# Patient Record
Sex: MALE | Race: WHITE | NOT HISPANIC OR LATINO | Employment: OTHER | URBAN - METROPOLITAN AREA
[De-identification: names, ages, dates, MRNs, and addresses within clinical notes are randomized per-mention and may not be internally consistent; named-entity substitution may affect disease eponyms.]

---

## 2017-01-16 ENCOUNTER — OFFICE VISIT (OUTPATIENT)
Dept: ORTHOPEDIC SURGERY | Facility: CLINIC | Age: 67
End: 2017-01-16

## 2017-01-16 VITALS — WEIGHT: 242 LBS | BODY MASS INDEX: 32.78 KG/M2 | HEIGHT: 72 IN | TEMPERATURE: 97.8 F

## 2017-01-16 DIAGNOSIS — R52 PAIN: Primary | ICD-10-CM

## 2017-01-16 DIAGNOSIS — M17.10 OSTEOARTHROSIS, LOCALIZED, PRIMARY, KNEE, UNSPECIFIED LATERALITY: ICD-10-CM

## 2017-01-16 PROCEDURE — 20610 DRAIN/INJ JOINT/BURSA W/O US: CPT | Performed by: ORTHOPAEDIC SURGERY

## 2017-01-16 RX ORDER — LIDOCAINE HYDROCHLORIDE 10 MG/ML
4 INJECTION, SOLUTION INFILTRATION; PERINEURAL
Status: COMPLETED | OUTPATIENT
Start: 2017-01-16 | End: 2017-01-16

## 2017-01-16 RX ADMIN — LIDOCAINE HYDROCHLORIDE 4 ML: 10 INJECTION, SOLUTION INFILTRATION; PERINEURAL at 13:54

## 2017-01-16 NOTE — PROGRESS NOTES
"Synvisc Injection      Patient: Aldair Cordova        YOB: 1950            Chief Complaints: Knee pain      History of Present Illness: Pt gets intermittent  injections with good relief. Is here for repeat injection. Understands options.      Physical Exam: 66 y.o. male  General Appearance:    Alert, cooperative, in no acute distress                   Vitals:    01/16/17 1330   Temp: 97.8 °F (36.6 °C)   Weight: 242 lb (110 kg)   Height: 72\" (182.9 cm)      Patient is alert and read ×3 no acute distress appears her above-listed at height weight and age.  Affect is normal respiratory rate is normal unlabored. Heart rate regular rate rhythm, sclera, dentition and hearing are normal for the purpose of this exam.  Exam and complaints are unchanged.      Procedure:  Large Joint Arthrocentesis  Date/Time: 1/16/2017 1:54 PM  Consent given by: patient  Site marked: site marked  Timeout: Immediately prior to procedure a time out was called to verify the correct patient, procedure, equipment, support staff and site/side marked as required   Supporting Documentation  Indications: pain   Procedure Details  Location: knee - R knee  Needle size: 25 G  Approach: anteromedial  Medications administered: 4 mL lidocaine 1 %; 48 mg hylan 48 MG/6ML      Large Joint Arthrocentesis  Date/Time: 1/16/2017 1:54 PM  Consent given by: patient  Site marked: site marked  Timeout: Immediately prior to procedure a time out was called to verify the correct patient, procedure, equipment, support staff and site/side marked as required   Supporting Documentation  Indications: pain   Procedure Details  Location: knee - L knee  Needle size: 25 G  Approach: anteromedial  Medications administered: 4 mL lidocaine 1 %; 48 mg hylan 48 MG/6ML                  Assessment. Persistent knee pain      Plan: Is to proceed with injection    The knee joint was injected under strict sterile technique with Synvisc this was done sterilely and tolerated " tolerated well.  He understands his options and will give me a call

## 2017-01-16 NOTE — MR AVS SNAPSHOT
Aldair Cordova   1/16/2017 1:30 PM   Office Visit    Dept Phone:  [phone]   Encounter #:  [id number]    Provider:  Rosa Irizarry MD   Department:  [facility]                Your Full Care Plan              Your Updated Medication List          This list is accurate as of: 1/16/17  2:21 PM.  Always use your most recent med list.                aspirin 81 MG tablet       benazepril 10 MG tablet   Commonly known as:  LOTENSIN   TAKE 1 TABLET DAILY       doxazosin 4 MG tablet   Commonly known as:  CARDURA   Take 1 tablet by mouth daily.       EQL FISH OIL 1000 MG capsule       GLUCOSAMINE CHONDR 1500 COMPLX PO       HYDROcodone-acetaminophen 7.5-325 MG per tablet   Commonly known as:  NORCO   Take 1 tablet by mouth Every 6 (Six) Hours As Needed for severe pain (7-10).       Magnesium 250 MG tablet       meloxicam 7.5 MG tablet   Commonly known as:  MOBIC   Take 1 tablet by mouth Daily.       MULTI COMPLETE capsule       pantoprazole 40 MG EC tablet   Commonly known as:  PROTONIX   Take 1 tablet by mouth Daily.       * simvastatin 40 MG tablet   Commonly known as:  ZOCOR   TAKE 1 TABLET EVERY NIGHT       * simvastatin 40 MG tablet   Commonly known as:  ZOCOR   TAKE 1 TABLET DAILY PLEASE MAKE AN APPOINTMENT WITH   YOUR DOCTOR       * Notice:  This list has 2 medication(s) that are the same as other medications prescribed for you. Read the directions carefully, and ask your doctor or other care provider to review them with you.            We Performed the Following     Large Joint Arthrocentesis     Large Joint Arthrocentesis       You Were Diagnosed With        Codes Comments    Pain    -  Primary ICD-10-CM: R52  ICD-9-CM: 780.96       Medications to be Given to You by a Medical Professional     Due       Frequency    12/13/2016 hylan (SYNVISC ONE) injection 48 mg  Once      Instructions     None    Patient Instructions History    "  Upcoming Appointments     Visit Type Date Time Department    FOLLOW UP 2017  1:30 PM [facility]    OFFICE VISIT 2017  8:45 AM [facility] Signup     [facility] allows you to send messages to your doctor, view your test results, renew your prescriptions, schedule appointments, and more. To sign up, go to [employer]Lex and click on the Sign Up Now link in the New User? box. Enter your [employer] Activation Code exactly as it appears below along with the last four digits of your Social Security Number and your Date of Birth () to complete the sign-up process. If you do not sign up before the expiration date, you must request a new code.    [employer] Activation Code: [email or web address]  Expires: 2017  5:38 AM    If you have questions, you can email [email or web address] or call [phone] to talk to our [employer] staff. Remember, [employer] is NOT to be used for urgent needs. For medical emergencies, dial 911.               Other Info from Your Visit           Your Appointments     2017  8:45 AM EST   Office Visit with Agustin Guerra MD   [facility] (--)    [address] [place] [id number] [phone]           Arrive 15 minutes prior to appointment.              Allergies     No Known Allergies      Reason for Visit     Left Knee - Follow-up, Pain     Right Knee - Follow-up, Pain           Vital Signs     Temperature Height Weight Body Mass Index Smoking Status       97.8 °F (36.6 °C) 72\" (182.9 cm) 242 lb (110 kg) 32.82 kg/m2 Never Smoker       Problems and Diagnoses Noted     Pain    -  Primary        "

## 2017-01-16 NOTE — PROGRESS NOTES
"Knee Follow Up      Patient: Aldair Cordova        YOB: 1950            Chief Complaints: bilateral knee pain      History of Present Illness:      Physical Exam: 66 y.o. male  General Appearance:    Alert, cooperative, in no acute distress                   Vitals:    01/16/17 1330   Temp: 97.8 °F (36.6 °C)   Weight: 242 lb (110 kg)   Height: 72\" (182.9 cm)        Patient is alert and read ×3 no acute distress appears her above-listed at height weight and age.  Affect is normal respiratory rate is normal unlabored. Heart rate regular rate rhythm, sclera, dentition and hearing are normal for the purpose of this exam.      Ortho Exam                 Assessment/Plan:            "

## 2017-02-01 RX ORDER — PANTOPRAZOLE SODIUM 40 MG/1
TABLET, DELAYED RELEASE ORAL
Qty: 90 TABLET | Refills: 2 | Status: SHIPPED | OUTPATIENT
Start: 2017-02-01 | End: 2017-02-06

## 2017-02-06 ENCOUNTER — OFFICE VISIT (OUTPATIENT)
Dept: FAMILY MEDICINE CLINIC | Facility: CLINIC | Age: 67
End: 2017-02-06

## 2017-02-06 VITALS
DIASTOLIC BLOOD PRESSURE: 80 MMHG | RESPIRATION RATE: 18 BRPM | BODY MASS INDEX: 32.85 KG/M2 | TEMPERATURE: 97.6 F | OXYGEN SATURATION: 97 % | HEART RATE: 56 BPM | WEIGHT: 242.5 LBS | HEIGHT: 72 IN | SYSTOLIC BLOOD PRESSURE: 130 MMHG

## 2017-02-06 DIAGNOSIS — E66.01 MORBID OBESITY DUE TO EXCESS CALORIES (HCC): ICD-10-CM

## 2017-02-06 DIAGNOSIS — I10 ESSENTIAL HYPERTENSION: ICD-10-CM

## 2017-02-06 DIAGNOSIS — M17.10 OSTEOARTHROSIS, LOCALIZED, PRIMARY, KNEE, UNSPECIFIED LATERALITY: ICD-10-CM

## 2017-02-06 DIAGNOSIS — Z00.00 HEALTH CARE MAINTENANCE: ICD-10-CM

## 2017-02-06 DIAGNOSIS — E78.00 PURE HYPERCHOLESTEROLEMIA: Primary | ICD-10-CM

## 2017-02-06 PROCEDURE — 90670 PCV13 VACCINE IM: CPT | Performed by: INTERNAL MEDICINE

## 2017-02-06 PROCEDURE — 99214 OFFICE O/P EST MOD 30 MIN: CPT | Performed by: INTERNAL MEDICINE

## 2017-02-06 PROCEDURE — 90471 IMMUNIZATION ADMIN: CPT | Performed by: INTERNAL MEDICINE

## 2017-02-06 RX ORDER — DOXAZOSIN MESYLATE 4 MG/1
4 TABLET ORAL DAILY
Qty: 90 TABLET | Refills: 3 | Status: SHIPPED | OUTPATIENT
Start: 2017-02-06 | End: 2018-02-04 | Stop reason: SDUPTHER

## 2017-02-06 NOTE — PROGRESS NOTES
"Subjective   Patient ID: Aldair Cordova is a 67 y.o. male presents with   Chief Complaint   Patient presents with   • Hyperlipidemia     f/u   • Hypertension     said he had labs done at [facility] on wed HPI - this patient presents today for follow-up for hypertension, hypercholesterolemia, obesity, osteoarthritis of the knee.  Overall he's doing pretty well with the exception of his knees.  He sees orthopedics and they've been doing some injections.  I want him to lose more weight I think this would help his knees cholesterol and blood pressure.    Assessment plan    Hypertension controlled with benazepril 10    Hyperlipidemia-controlled with simvastatin 40    Osteoarthritis he sees orthopedics and I want him to lose more weight    Obesity-talked about exercise diet weight loss    Health care maintenance-Prevnar 13, recommended getting shingles done at his local pharmacy and a flu shot.    No Known Allergies    The following portions of the patient's history were reviewed and updated as appropriate: allergies, current medications, past family history, past medical history, past social history, past surgical history and problem list.      Review of Systems   Constitutional: Negative.    HENT: Negative.    Eyes: Negative.    Respiratory: Negative.    Cardiovascular: Negative.    Gastrointestinal: Negative.    Endocrine: Negative.    Genitourinary: Negative.    Musculoskeletal: Positive for arthralgias and gait problem.   Skin: Negative.    Allergic/Immunologic: Negative.    Hematological: Negative.    Psychiatric/Behavioral: Negative.        Objective     Vitals:    02/06/17 0831   BP: 130/80   Pulse: 56   Resp: 18   Temp: 97.6 °F (36.4 °C)   TempSrc: Oral   SpO2: 97%   Weight: 242 lb 8 oz (110 kg)   Height: 72\" (182.9 cm)         Physical Exam   Constitutional: He is oriented to person, place, and time. He appears well-developed and well-nourished. No distress.   HENT:   Head: Normocephalic and atraumatic. "   Eyes: Conjunctivae and EOM are normal. Pupils are equal, round, and reactive to light. Right eye exhibits no discharge. Left eye exhibits no discharge. No scleral icterus.   Neck: Normal range of motion. Neck supple. No tracheal deviation present. No thyromegaly present.   Cardiovascular: Normal rate, regular rhythm, normal heart sounds and normal pulses.  Exam reveals no gallop.    No murmur heard.  Pulmonary/Chest: Effort normal and breath sounds normal. No respiratory distress. He has no wheezes. He has no rales.   Musculoskeletal: Normal range of motion.   Neurological: He is alert and oriented to person, place, and time.   Skin: Skin is warm. No rash noted. No erythema. No pallor.   Psychiatric: He has a normal mood and affect. His behavior is normal. Judgment and thought content normal.   Nursing note and vitals reviewed.        Aldair was seen today for hyperlipidemia and hypertension.    Diagnoses and all orders for this visit:    Pure hypercholesterolemia    Health care maintenance    Essential hypertension    Morbid obesity due to excess calories    Osteoarthrosis, localized, primary, knee, unspecified laterality    Other orders  -     Pneumococcal Conjugate Vaccine 13-Valent All        Call or return to clinic prn if these symptoms worsen or fail to improve as anticipated.

## 2017-03-15 RX ORDER — SIMVASTATIN 40 MG
TABLET ORAL
Qty: 90 TABLET | Refills: 1 | Status: SHIPPED | OUTPATIENT
Start: 2017-03-15 | End: 2017-09-14 | Stop reason: SDUPTHER

## 2017-05-31 RX ORDER — SIMVASTATIN 40 MG
TABLET ORAL
Qty: 90 TABLET | Refills: 0 | Status: SHIPPED | OUTPATIENT
Start: 2017-05-31 | End: 2020-07-08

## 2017-05-31 RX ORDER — MELOXICAM 7.5 MG/1
TABLET ORAL
Qty: 90 TABLET | Refills: 1 | Status: SHIPPED | OUTPATIENT
Start: 2017-05-31 | End: 2017-12-09 | Stop reason: SDUPTHER

## 2017-07-18 DIAGNOSIS — E78.5 HYPERLIPIDEMIA, UNSPECIFIED HYPERLIPIDEMIA TYPE: ICD-10-CM

## 2017-07-18 DIAGNOSIS — I15.9 SECONDARY HYPERTENSION: ICD-10-CM

## 2017-07-18 DIAGNOSIS — N40.0 BENIGN NON-NODULAR PROSTATIC HYPERPLASIA WITHOUT LOWER URINARY TRACT SYMPTOMS: Primary | ICD-10-CM

## 2017-07-19 ENCOUNTER — HOSPITAL ENCOUNTER (EMERGENCY)
Facility: HOSPITAL | Age: 67
Discharge: HOME | End: 2017-07-19
Attending: EMERGENCY MEDICINE | Admitting: EMERGENCY MEDICINE

## 2017-07-19 ENCOUNTER — APPOINTMENT (OUTPATIENT)
Dept: CT IMAGING | Facility: HOSPITAL | Age: 67
End: 2017-07-19

## 2017-07-19 VITALS
RESPIRATION RATE: 15 BRPM | WEIGHT: 240 LBS | SYSTOLIC BLOOD PRESSURE: 129 MMHG | HEART RATE: 62 BPM | DIASTOLIC BLOOD PRESSURE: 80 MMHG | TEMPERATURE: 98.7 F | OXYGEN SATURATION: 95 % | BODY MASS INDEX: 32.51 KG/M2 | HEIGHT: 72 IN

## 2017-07-19 DIAGNOSIS — R53.1 GENERALIZED WEAKNESS: Primary | ICD-10-CM

## 2017-07-19 DIAGNOSIS — R20.2 TINGLING IN EXTREMITIES: ICD-10-CM

## 2017-07-19 LAB
ALBUMIN SERPL-MCNC: 4.4 G/DL (ref 3.5–5.2)
ALBUMIN/GLOB SERPL: 1.5 G/DL
ALP SERPL-CCNC: 67 U/L (ref 39–117)
ALT SERPL W P-5'-P-CCNC: 28 U/L (ref 1–41)
ANION GAP SERPL CALCULATED.3IONS-SCNC: 13.7 MMOL/L
APTT PPP: 30.1 SECONDS (ref 22.7–35.4)
AST SERPL-CCNC: 23 U/L (ref 1–40)
BASOPHILS # BLD AUTO: 0.04 10*3/MM3 (ref 0–0.2)
BASOPHILS NFR BLD AUTO: 0.7 % (ref 0–1.5)
BILIRUB SERPL-MCNC: 0.3 MG/DL (ref 0.1–1.2)
BUN BLD-MCNC: 12 MG/DL (ref 8–23)
BUN/CREAT SERPL: 11.3 (ref 7–25)
CALCIUM SPEC-SCNC: 9.5 MG/DL (ref 8.6–10.5)
CHLORIDE SERPL-SCNC: 104 MMOL/L (ref 98–107)
CO2 SERPL-SCNC: 22.3 MMOL/L (ref 22–29)
CREAT BLD-MCNC: 1.06 MG/DL (ref 0.76–1.27)
DEPRECATED RDW RBC AUTO: 39.5 FL (ref 37–54)
EOSINOPHIL # BLD AUTO: 0.1 10*3/MM3 (ref 0–0.7)
EOSINOPHIL NFR BLD AUTO: 1.8 % (ref 0.3–6.2)
ERYTHROCYTE [DISTWIDTH] IN BLOOD BY AUTOMATED COUNT: 12.3 % (ref 11.5–14.5)
GFR SERPL CREATININE-BSD FRML MDRD: 70 ML/MIN/1.73
GLOBULIN UR ELPH-MCNC: 2.9 GM/DL
GLUCOSE BLD-MCNC: 118 MG/DL (ref 65–99)
HCT VFR BLD AUTO: 43.3 % (ref 40.4–52.2)
HGB BLD-MCNC: 14.7 G/DL (ref 13.7–17.6)
IMM GRANULOCYTES # BLD: 0 10*3/MM3 (ref 0–0.03)
IMM GRANULOCYTES NFR BLD: 0 % (ref 0–0.5)
INR PPP: 1 (ref 0.9–1.1)
LYMPHOCYTES # BLD AUTO: 1.45 10*3/MM3 (ref 0.9–4.8)
LYMPHOCYTES NFR BLD AUTO: 26.5 % (ref 19.6–45.3)
MCH RBC QN AUTO: 29.6 PG (ref 27–32.7)
MCHC RBC AUTO-ENTMCNC: 33.9 G/DL (ref 32.6–36.4)
MCV RBC AUTO: 87.3 FL (ref 79.8–96.2)
MONOCYTES # BLD AUTO: 0.54 10*3/MM3 (ref 0.2–1.2)
MONOCYTES NFR BLD AUTO: 9.9 % (ref 5–12)
NEUTROPHILS # BLD AUTO: 3.35 10*3/MM3 (ref 1.9–8.1)
NEUTROPHILS NFR BLD AUTO: 61.1 % (ref 42.7–76)
NRBC BLD MANUAL-RTO: 0 /100 WBC (ref 0–0)
PLATELET # BLD AUTO: 121 10*3/MM3 (ref 140–500)
PMV BLD AUTO: 10.8 FL (ref 6–12)
POTASSIUM BLD-SCNC: 4.4 MMOL/L (ref 3.5–5.2)
PROT SERPL-MCNC: 7.3 G/DL (ref 6–8.5)
PROTHROMBIN TIME: 12.8 SECONDS (ref 11.7–14.2)
RBC # BLD AUTO: 4.96 10*6/MM3 (ref 4.6–6)
SODIUM BLD-SCNC: 140 MMOL/L (ref 136–145)
TROPONIN T SERPL-MCNC: <0.01 NG/ML (ref 0–0.03)
WBC NRBC COR # BLD: 5.48 10*3/MM3 (ref 4.5–10.7)

## 2017-07-19 PROCEDURE — 85610 PROTHROMBIN TIME: CPT | Performed by: EMERGENCY MEDICINE

## 2017-07-19 PROCEDURE — 80053 COMPREHEN METABOLIC PANEL: CPT | Performed by: EMERGENCY MEDICINE

## 2017-07-19 PROCEDURE — 70450 CT HEAD/BRAIN W/O DYE: CPT

## 2017-07-19 PROCEDURE — 85730 THROMBOPLASTIN TIME PARTIAL: CPT | Performed by: EMERGENCY MEDICINE

## 2017-07-19 PROCEDURE — 93005 ELECTROCARDIOGRAM TRACING: CPT | Performed by: EMERGENCY MEDICINE

## 2017-07-19 PROCEDURE — 84484 ASSAY OF TROPONIN QUANT: CPT | Performed by: EMERGENCY MEDICINE

## 2017-07-19 PROCEDURE — 85025 COMPLETE CBC W/AUTO DIFF WBC: CPT | Performed by: EMERGENCY MEDICINE

## 2017-07-19 PROCEDURE — 99284 EMERGENCY DEPT VISIT MOD MDM: CPT

## 2017-07-19 PROCEDURE — 93010 ELECTROCARDIOGRAM REPORT: CPT | Performed by: INTERNAL MEDICINE

## 2017-07-19 NOTE — ED PROVIDER NOTES
" EMERGENCY DEPARTMENT ENCOUNTER    CHIEF COMPLAINT  Chief Complaint: generalized weakness/extremity tingling  History given by: patient  History limited by: nothing  Room Number: 18/18  PMD: Agustin Guerra MD      HPI:  Pt is a 67 y.o. male who presents complaining of sudden onset of generalized weakness, tingling in both upper extremities, and an approximately 15 minute \"tunnel vision\" where patient states could not see peripherally out of either eye.  Symptoms markedly improved without intervention, however states weakness overall remains.  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  Denies feeling overheated or dehydrated.  Denies CP, SOA, N/V, diarrhea, or abdominal pain.  Pt currently complains of a mild generalized headache.  Denies fever or neck pain      Duration:  15 minutes  Onset: 2 hours ago  Timing:  sudden  Location: generalized  Radiation: none  Quality: tingling/numbness  Intensity/Severity: mild  Progression: improved/resolved  Associated Symptoms: tingling extremities/tunnel vision  Aggravating Factors: none noted  Alleviating Factors: none noted  Previous Episodes: none  Treatment before arrival: none    PAST MEDICAL HISTORY  Active Ambulatory Problems     Diagnosis Date Noted   • Anxiety 02/02/2016   • Arthritis 02/02/2016   • Gastroesophageal reflux disease with esophagitis 02/02/2016   • Hyperlipidemia 02/02/2016   • Hypertension 02/02/2016   • Nocturia 02/02/2016   • Thrombocytopenia 02/08/2016   • Morbid obesity due to excess calories 08/08/2016   • Acute pain of left knee 11/07/2016   • Left knee pain 12/15/2016   • Osteoarthrosis, localized, primary, knee 12/15/2016   • Health care maintenance 02/06/2017     Resolved Ambulatory Problems     Diagnosis Date Noted   • No Resolved Ambulatory Problems     Past Medical History:   Diagnosis Date   • Benign essential hypertension    • GERD (gastroesophageal reflux disease)    • HLD (hyperlipidemia)        PAST SURGICAL " HISTORY  Past Surgical History:   Procedure Laterality Date   • COLONOSCOPY  10/21/2016   • KIDNEY STONE SURGERY     • KNEE SURGERY         FAMILY HISTORY  Family History   Problem Relation Age of Onset   • Cancer Mother      lung       SOCIAL HISTORY  Social History     Social History   • Marital status:      Spouse name: N/A   • Number of children: N/A   • Years of education: N/A     Occupational History   • Not on file.     Social History Main Topics   • Smoking status: Never Smoker   • Smokeless tobacco: Never Used   • Alcohol use No   • Drug use: No   • Sexual activity: Defer     Other Topics Concern   • Not on file     Social History Narrative   • No narrative on file       ALLERGIES  Review of patient's allergies indicates no known allergies.    REVIEW OF SYSTEMS  Review of Systems   Constitutional: Negative for activity change, appetite change and fever.   HENT: Negative for congestion and sore throat.    Eyes: Negative.    Respiratory: Negative for cough and shortness of breath.    Cardiovascular: Negative for chest pain and leg swelling.   Gastrointestinal: Negative for abdominal pain, diarrhea and vomiting.   Endocrine: Negative.    Genitourinary: Negative for decreased urine volume and dysuria.   Musculoskeletal: Negative for neck pain.   Skin: Negative for rash and wound.   Allergic/Immunologic: Negative.    Neurological: Positive for weakness, light-headedness, numbness and headaches.        Numbness/tingling to bilateral upper extremities (fingers)   Hematological: Negative.    Psychiatric/Behavioral: Negative.    All other systems reviewed and are negative.      PHYSICAL EXAM  ED Triage Vitals   Temp Heart Rate Resp BP SpO2   07/19/17 0409 07/19/17 0409 07/19/17 0409 07/19/17 0409 07/19/17 0409   98.7 °F (37.1 °C) 70 16 126/88 99 %      Temp src Heart Rate Source Patient Position BP Location FiO2 (%)   -- -- -- -- --              Physical Exam   Constitutional: He is oriented to person, place,  and time. He appears distressed.   HENT:   Head: Normocephalic and atraumatic.   Eyes: Conjunctivae and EOM are normal. Pupils are equal, round, and reactive to light.   Neck: No tracheal deviation present. No thyromegaly present.   Cardiovascular: Normal rate, regular rhythm and normal heart sounds.    No murmur heard.  Pulmonary/Chest: Effort normal and breath sounds normal. No respiratory distress. He has no wheezes. He has no rales.   Abdominal: Soft. He exhibits no distension. There is no tenderness.   Musculoskeletal: He exhibits no edema or deformity.   Neurological: He is alert and oriented to person, place, and time. No cranial nerve deficit. Gait normal. GCS score is 15.   Skin: Skin is dry. No rash noted.       LAB RESULTS  Lab Results (last 24 hours)     Procedure Component Value Units Date/Time    Protime-INR [[id number]]  (Normal) Collected:  07/19/17 0255    Specimen:  Blood Updated:  07/19/17 0429     Protime 12.8 Seconds      INR 1.00    aPTT [[id number]]  (Normal) Collected:  07/19/17 0255    Specimen:  Blood Updated:  07/19/17 0429     PTT 30.1 seconds     CBC & Differential [[id number]] Collected:  07/19/17 0255    Specimen:  Blood Updated:  07/19/17 0432    Narrative:       The following orders were created for panel order CBC & Differential.  Procedure                               Abnormality         Status                     ---------                               -----------         ------                     CBC Auto Differential[[id number]]        Abnormal            Final result                 Please view results for these tests on the individual orders.    CBC Auto Differential [[id number]]  (Abnormal) Collected:  07/19/17 0255    Specimen:  Blood Updated:  07/19/17 0432     WBC 5.48 10*3/mm3      RBC 4.96 10*6/mm3      Hemoglobin 14.7 g/dL      Hematocrit 43.3 %      MCV 87.3 fL      MCH 29.6 pg      MCHC 33.9 g/dL      RDW 12.3 %      RDW-SD 39.5 fl      MPV 10.8 fL      Platelets 121 (L)  10*3/mm3      Neutrophil % 61.1 %      Lymphocyte % 26.5 %      Monocyte % 9.9 %      Eosinophil % 1.8 %      Basophil % 0.7 %      Immature Grans % 0.0 %      Neutrophils, Absolute 3.35 10*3/mm3      Lymphocytes, Absolute 1.45 10*3/mm3      Monocytes, Absolute 0.54 10*3/mm3      Eosinophils, Absolute 0.10 10*3/mm3      Basophils, Absolute 0.04 10*3/mm3      Immature Grans, Absolute 0.00 10*3/mm3      nRBC 0.0 /100 WBC     Comprehensive Metabolic Panel [[id number]]  (Abnormal) Collected:  07/19/17 0435    Specimen:  Blood Updated:  07/19/17 0438     Glucose 118 (H) mg/dL      BUN 12 mg/dL      Creatinine 1.06 mg/dL      Sodium 140 mmol/L      Potassium 4.4 mmol/L      Chloride 104 mmol/L      CO2 22.3 mmol/L      Calcium 9.5 mg/dL      Total Protein 7.3 g/dL      Albumin 4.40 g/dL      ALT (SGPT) 28 U/L      AST (SGOT) 23 U/L      Alkaline Phosphatase 67 U/L      Total Bilirubin 0.3 mg/dL      eGFR Non African Amer 70 mL/min/1.73      Globulin 2.9 gm/dL      A/G Ratio 1.5 g/dL      BUN/Creatinine Ratio 11.3     Anion Gap 13.7 mmol/L     Troponin [[id number]]  (Normal) Collected:  07/19/17 0435    Specimen:  Blood Updated:  07/19/17 0438     Troponin T <0.010 ng/mL     Narrative:       Troponin T Reference Ranges:  Less than 0.03 ng/mL:    Negative for AMI  0.03 to 0.09 ng/mL:      Indeterminant for AMI  Greater than 0.09 ng/mL: Positive for AMI          I ordered the above labs and reviewed the results    RADIOLOGY  CT Head Without Contrast    (Results Pending)    negative    I ordered the above noted radiological studies. Interpreted by radiologist.  Reviewed by me in PACS.       PROCEDURES  Procedures    EKG         EKG time: 0254   Rhythm/Rate: sinus rhythm, rate: Sinus bradycardia, 53   Normal P waves and normal SD interval   Normal QRS, Normal axis  Normal ST and T waves    Interpreted Contemporaneously by me, independently viewed  No prior EKG for comparison.      PROGRESS AND CONSULTS  ED Course   Value Comment  By Time   Glucose: (!) 118 (Reviewed) Teodoro Sadler 07/19 0604     0430:  Re-evaluation of pt, pt states feels much better after the fluids.  Pt ambulated to the bathroom without assistance or difficulty and with a steady gait.  All questions or concerns addressed and answered.  Will follow up with pmd.      MEDICAL DECISION MAKING  Results were reviewed/discussed with the patient and they were also made aware of online access. Pt also made aware that some labs, such as cultures, will not be resulted during ER visit and follow up with PMD is necessary.     MDM  Number of Diagnoses or Management Options  Generalized weakness:   Tingling in extremities:      Amount and/or Complexity of Data Reviewed  Clinical lab tests: reviewed and ordered (Troponin <0.010, Glucose 118)  Tests in the radiology section of CPT®: reviewed and ordered (CT head negative acute)  Tests in the medicine section of CPT®: reviewed and ordered (See EKG procedure note)  Decide to obtain previous medical records or to obtain history from someone other than the patient: yes  Review and summarize past medical records: yes           DIAGNOSIS  Final diagnoses:   Generalized weakness   Tingling in extremities       DISPOSITION  DISCHARGE    Patient discharged in stable condition.    Reviewed implications of results, diagnosis, meds, responsibility to follow up, warning signs and symptoms of possible worsening, potential complications and reasons to return to ER, including fever, worsening symptoms or other concerns.    Patient/Family voiced understanding of above instructions.    Discussed plan for discharge, as there is no emergent indication for admission.  Pt/family is agreeable and understands need for follow up and repeat testing.  Pt is aware that discharge does not mean that nothing is wrong but it indicates no emergency is present that requires admission and they must continue care with follow-up as given below or physician of their choice.      FOLLOW-UP  Agustin Guerra MD  [address]  [address]  [facility] [address]  [phone]    Schedule an appointment as soon as possible for a visit           Medication List      Notice     No changes were made to your prescriptions during this visit.            Latest Documented Vital Signs:  As of 7:27 AM  BP- 129/80 HR- 62 Temp- 98.7 °F (37.1 °C) O2 sat- 95%    --  Documentation assistance provided by nickolas Sadler for .  Information recorded by the nickolas was done at my direction and has been verified and validated by me.         Teodoro Sadler  07/19/17 0606       Teodoro Sadler  07/19/17 0625       Keon Jason MD  07/19/17 0728

## 2017-07-20 ENCOUNTER — TELEPHONE (OUTPATIENT)
Dept: SOCIAL WORK | Facility: HOSPITAL | Age: 67
End: 2017-07-20

## 2017-07-20 NOTE — TELEPHONE ENCOUNTER
ED f/u phone call: states that he feels better, and has upcoming f/u brittny't w/ PCP. No questions/concerns

## 2017-08-07 ENCOUNTER — OFFICE VISIT (OUTPATIENT)
Dept: FAMILY MEDICINE CLINIC | Facility: CLINIC | Age: 67
End: 2017-08-07

## 2017-08-07 VITALS
HEIGHT: 72 IN | SYSTOLIC BLOOD PRESSURE: 122 MMHG | RESPIRATION RATE: 16 BRPM | HEART RATE: 94 BPM | OXYGEN SATURATION: 96 % | DIASTOLIC BLOOD PRESSURE: 64 MMHG | TEMPERATURE: 98.4 F | BODY MASS INDEX: 33.7 KG/M2 | WEIGHT: 248.8 LBS

## 2017-08-07 DIAGNOSIS — E66.01 MORBID OBESITY DUE TO EXCESS CALORIES (HCC): ICD-10-CM

## 2017-08-07 DIAGNOSIS — E78.00 PURE HYPERCHOLESTEROLEMIA: Primary | ICD-10-CM

## 2017-08-07 DIAGNOSIS — D69.6 THROMBOCYTOPENIA (HCC): ICD-10-CM

## 2017-08-07 DIAGNOSIS — I10 ESSENTIAL HYPERTENSION: ICD-10-CM

## 2017-08-07 PROCEDURE — 99214 OFFICE O/P EST MOD 30 MIN: CPT | Performed by: INTERNAL MEDICINE

## 2017-08-07 NOTE — PROGRESS NOTES
"Subjective   Patient ID: Aldair Cordova is a 67 y.o. male presents with   Chief Complaint   Patient presents with   • Hyperlipidemia     pt already had labs   • Hypertension       HPI - This patient presents today for follow-up for hypercholesterolemia hypertension, obesity, thrombocytopenia.  We reviewed his labs thrombocytopenia is stable.  His cholesterols under good control with simvastatin and blood pressure is controlled with benazepril.  He's caught up on colonoscopy and vaccinations he feels good.    Assessment plan    Hyperlipidemia continue simvastatin 40 recommend exercise diet weight loss    Hypertension controlled with benazepril 10    , Cytopenia stable    Obesity counseled on excised diet weight loss.    No Known Allergies    The following portions of the patient's history were reviewed and updated as appropriate: allergies, current medications, past family history, past medical history, past social history, past surgical history and problem list.      Review of Systems   Constitutional: Negative.    HENT: Negative.    Eyes: Negative.    Respiratory: Negative.    Cardiovascular: Negative.    Gastrointestinal: Negative.    Endocrine: Negative.    Genitourinary: Negative.    Musculoskeletal: Negative.    Skin: Negative.    Allergic/Immunologic: Negative.    Neurological: Negative.    Hematological: Negative.    Psychiatric/Behavioral: Negative.        Objective     Vitals:    08/07/17 0828   BP: 122/64   Pulse: 94   Resp: 16   Temp: 98.4 °F (36.9 °C)   TempSrc: Oral   SpO2: 96%   Weight: 248 lb 12.8 oz (113 kg)   Height: 72\" (182.9 cm)         Physical Exam   Constitutional: He is oriented to person, place, and time. He appears well-developed and well-nourished. No distress.   HENT:   Head: Normocephalic and atraumatic.   Eyes: Conjunctivae and EOM are normal. Pupils are equal, round, and reactive to light. Right eye exhibits no discharge. Left eye exhibits no discharge. No scleral icterus.   Neck: " Normal range of motion. Neck supple. No tracheal deviation present. No thyromegaly present.   Cardiovascular: Normal rate, regular rhythm, normal heart sounds and normal pulses.  Exam reveals no gallop.    No murmur heard.  Pulmonary/Chest: Effort normal and breath sounds normal. No respiratory distress. He has no wheezes. He has no rales.   Musculoskeletal: Normal range of motion.   Neurological: He is alert and oriented to person, place, and time.   Skin: Skin is warm. No rash noted. No erythema. No pallor.   Psychiatric: He has a normal mood and affect. His behavior is normal. Judgment and thought content normal.   Nursing note and vitals reviewed.        Aldair was seen today for hyperlipidemia and hypertension.    Diagnoses and all orders for this visit:    Pure hypercholesterolemia    Essential hypertension    Thrombocytopenia    Morbid obesity due to excess calories        Call or return to clinic prn if these symptoms worsen or fail to improve as anticipated.

## 2017-09-11 RX ORDER — SIMVASTATIN 40 MG
TABLET ORAL
Qty: 90 TABLET | Refills: 0 | OUTPATIENT
Start: 2017-09-11

## 2017-09-14 RX ORDER — SIMVASTATIN 40 MG
40 TABLET ORAL NIGHTLY
Qty: 90 TABLET | Refills: 1 | Status: SHIPPED | OUTPATIENT
Start: 2017-09-14 | End: 2018-02-05

## 2017-12-11 RX ORDER — BENAZEPRIL HYDROCHLORIDE 10 MG/1
TABLET ORAL
Qty: 90 TABLET | Refills: 0 | Status: SHIPPED | OUTPATIENT
Start: 2017-12-11 | End: 2018-03-25 | Stop reason: SDUPTHER

## 2017-12-11 RX ORDER — MELOXICAM 7.5 MG/1
TABLET ORAL
Qty: 90 TABLET | Refills: 0 | Status: SHIPPED | OUTPATIENT
Start: 2017-12-11 | End: 2018-03-03 | Stop reason: SDUPTHER

## 2018-01-25 DIAGNOSIS — I15.9 SECONDARY HYPERTENSION: Primary | ICD-10-CM

## 2018-01-25 DIAGNOSIS — Z00.00 HEALTH CARE MAINTENANCE: ICD-10-CM

## 2018-01-25 DIAGNOSIS — E78.5 HYPERLIPIDEMIA, UNSPECIFIED HYPERLIPIDEMIA TYPE: ICD-10-CM

## 2018-01-29 ENCOUNTER — OFFICE VISIT (OUTPATIENT)
Dept: SLEEP MEDICINE | Facility: HOSPITAL | Age: 68
End: 2018-01-29
Attending: INTERNAL MEDICINE

## 2018-01-29 VITALS
SYSTOLIC BLOOD PRESSURE: 137 MMHG | HEIGHT: 72 IN | HEART RATE: 67 BPM | BODY MASS INDEX: 33.59 KG/M2 | WEIGHT: 248 LBS | DIASTOLIC BLOOD PRESSURE: 84 MMHG

## 2018-01-29 DIAGNOSIS — Z99.89 OSA ON CPAP: Primary | ICD-10-CM

## 2018-01-29 DIAGNOSIS — I10 BENIGN ESSENTIAL HTN: ICD-10-CM

## 2018-01-29 DIAGNOSIS — G47.30 HYPERSOMNIA WITH SLEEP APNEA: ICD-10-CM

## 2018-01-29 DIAGNOSIS — E66.9 OBESITY (BMI 30-39.9): ICD-10-CM

## 2018-01-29 DIAGNOSIS — G47.10 HYPERSOMNIA WITH SLEEP APNEA: ICD-10-CM

## 2018-01-29 DIAGNOSIS — G47.33 OSA ON CPAP: Primary | ICD-10-CM

## 2018-01-29 DIAGNOSIS — Z72.820 SLEEP DEPRIVATION: ICD-10-CM

## 2018-01-29 DIAGNOSIS — G47.34 SLEEP RELATED HYPOXIA: ICD-10-CM

## 2018-01-29 PROCEDURE — G0463 HOSPITAL OUTPT CLINIC VISIT: HCPCS

## 2018-01-30 RX ORDER — INFLUENZA A VIRUS A/MICHIGAN/45/2015 X-275 (H1N1) ANTIGEN (FORMALDEHYDE INACTIVATED), INFLUENZA A VIRUS A/SINGAPORE/INFIMH-16-0019/2016 IVR-186 (H3N2) ANTIGEN (FORMALDEHYDE INACTIVATED), AND INFLUENZA B VIRUS B/MARYLAND/15/2016 BX-69A (A B/COLORADO/6/2017-LIKE VIRUS) ANTIGEN (FORMALDEHYDE INACTIVATED) 60; 60; 60 UG/.5ML; UG/.5ML; UG/.5ML
INJECTION, SUSPENSION INTRAMUSCULAR
Refills: 0 | COMMUNITY
Start: 2017-10-30 | End: 2018-02-05

## 2018-01-30 RX ORDER — CHLORHEXIDINE GLUCONATE 0.12 MG/ML
RINSE ORAL
Refills: 0 | COMMUNITY
Start: 2017-12-11 | End: 2018-02-05

## 2018-02-05 ENCOUNTER — OFFICE VISIT (OUTPATIENT)
Dept: FAMILY MEDICINE CLINIC | Facility: CLINIC | Age: 68
End: 2018-02-05

## 2018-02-05 VITALS
DIASTOLIC BLOOD PRESSURE: 80 MMHG | BODY MASS INDEX: 33.96 KG/M2 | OXYGEN SATURATION: 92 % | TEMPERATURE: 97.6 F | HEART RATE: 72 BPM | WEIGHT: 250.7 LBS | SYSTOLIC BLOOD PRESSURE: 124 MMHG | HEIGHT: 72 IN | RESPIRATION RATE: 16 BRPM

## 2018-02-05 DIAGNOSIS — I10 ESSENTIAL HYPERTENSION: ICD-10-CM

## 2018-02-05 DIAGNOSIS — R35.1 NOCTURIA: ICD-10-CM

## 2018-02-05 DIAGNOSIS — Z99.89 OSA ON CPAP: ICD-10-CM

## 2018-02-05 DIAGNOSIS — G47.33 OSA ON CPAP: ICD-10-CM

## 2018-02-05 DIAGNOSIS — E78.00 PURE HYPERCHOLESTEROLEMIA: Primary | ICD-10-CM

## 2018-02-05 PROCEDURE — 99214 OFFICE O/P EST MOD 30 MIN: CPT | Performed by: INTERNAL MEDICINE

## 2018-02-05 RX ORDER — DOXAZOSIN MESYLATE 4 MG/1
TABLET ORAL
Qty: 90 TABLET | Refills: 1 | Status: SHIPPED | OUTPATIENT
Start: 2018-02-05 | End: 2018-08-27 | Stop reason: SDUPTHER

## 2018-02-05 RX ORDER — PANTOPRAZOLE SODIUM 40 MG/1
TABLET, DELAYED RELEASE ORAL
Qty: 90 TABLET | Refills: 1 | Status: SHIPPED | OUTPATIENT
Start: 2018-02-05 | End: 2018-08-07

## 2018-02-05 NOTE — PROGRESS NOTES
"Subjective   Patient ID: Aldair Cordova is a 68 y.o. male presents with   Chief Complaint   Patient presents with   • Hyperlipidemia     f/u on labs       HPI - This patient presents today for follow-up and treatment of of essential hypertension, hypercholesterolemia, hypertension.  He gained some weight and is due does this in the wintertime we reviewed his labs overall labs look pretty good blood pressures under good control.    Assessment plan    Hypertension-controlled continue Lotensin    Hypercholesterolemia controlled on simvastatin 40    Obstructive sleep apnea.  Uses CPAP    BPH continue doxazosin    No Known Allergies    The following portions of the patient's history were reviewed and updated as appropriate: allergies, current medications, past family history, past medical history, past social history, past surgical history and problem list.      Review of Systems   Constitutional: Negative.    HENT: Negative.    Eyes: Negative.    Respiratory: Negative.    Cardiovascular: Negative.    Gastrointestinal: Negative.    Endocrine: Negative.    Genitourinary: Negative.    Musculoskeletal: Positive for arthralgias.   Skin: Negative.    Allergic/Immunologic: Negative.    Neurological: Negative.    Hematological: Negative.    Psychiatric/Behavioral: Negative.        Objective     Vitals:    02/05/18 0734   BP: 124/80   Pulse: 72   Resp: 16   Temp: 97.6 °F (36.4 °C)   TempSrc: Oral   SpO2: 92%   Weight: 114 kg (250 lb 11.2 oz)   Height: 182.9 cm (72\")         Physical Exam   Constitutional: He is oriented to person, place, and time. He appears well-developed and well-nourished. No distress.   HENT:   Head: Normocephalic and atraumatic.   Eyes: Conjunctivae and EOM are normal. Pupils are equal, round, and reactive to light. Right eye exhibits no discharge. Left eye exhibits no discharge. No scleral icterus.   Neck: Normal range of motion. Neck supple. No tracheal deviation present. No thyromegaly present. "   Cardiovascular: Normal rate, regular rhythm, normal heart sounds and normal pulses.  Exam reveals no gallop.    No murmur heard.  Pulmonary/Chest: Effort normal and breath sounds normal. No respiratory distress. He has no wheezes. He has no rales.   Abdominal: Soft. There is no tenderness.   Musculoskeletal: Normal range of motion.   Neurological: He is alert and oriented to person, place, and time.   Skin: Skin is warm. No rash noted. No erythema. No pallor.   Psychiatric: He has a normal mood and affect. His behavior is normal. Judgment and thought content normal.   Nursing note and vitals reviewed.        Aldair was seen today for hyperlipidemia.    Diagnoses and all orders for this visit:    Pure hypercholesterolemia    Essential hypertension    KIEL on CPAP    Nocturia        Call or return to clinic prn if these symptoms worsen or fail to improve as anticipated.

## 2018-02-06 ENCOUNTER — DOCUMENTATION (OUTPATIENT)
Dept: SLEEP MEDICINE | Facility: HOSPITAL | Age: 68
End: 2018-02-06

## 2018-03-05 RX ORDER — MELOXICAM 7.5 MG/1
TABLET ORAL
Qty: 90 TABLET | Refills: 0 | Status: SHIPPED | OUTPATIENT
Start: 2018-03-05 | End: 2018-05-11 | Stop reason: SDUPTHER

## 2018-03-05 RX ORDER — SIMVASTATIN 40 MG
TABLET ORAL
Qty: 90 TABLET | Refills: 1 | Status: SHIPPED | OUTPATIENT
Start: 2018-03-05 | End: 2018-08-27 | Stop reason: SDUPTHER

## 2018-03-26 ENCOUNTER — OFFICE VISIT (OUTPATIENT)
Dept: SLEEP MEDICINE | Facility: HOSPITAL | Age: 68
End: 2018-03-26
Attending: INTERNAL MEDICINE

## 2018-03-26 VITALS
WEIGHT: 250 LBS | SYSTOLIC BLOOD PRESSURE: 134 MMHG | HEIGHT: 72 IN | DIASTOLIC BLOOD PRESSURE: 72 MMHG | HEART RATE: 64 BPM | BODY MASS INDEX: 33.86 KG/M2

## 2018-03-26 DIAGNOSIS — G47.33 OSA ON CPAP: Primary | ICD-10-CM

## 2018-03-26 DIAGNOSIS — I10 ESSENTIAL HYPERTENSION: ICD-10-CM

## 2018-03-26 DIAGNOSIS — G47.10 HYPERSOMNIA WITH SLEEP APNEA: ICD-10-CM

## 2018-03-26 DIAGNOSIS — G47.30 HYPERSOMNIA WITH SLEEP APNEA: ICD-10-CM

## 2018-03-26 DIAGNOSIS — Z72.820 SLEEP DEPRIVATION: ICD-10-CM

## 2018-03-26 DIAGNOSIS — Z99.89 OSA ON CPAP: Primary | ICD-10-CM

## 2018-03-26 DIAGNOSIS — G47.26 SHIFT WORK SLEEP DISORDER: ICD-10-CM

## 2018-03-26 DIAGNOSIS — G47.34 SLEEP RELATED HYPOXIA: ICD-10-CM

## 2018-03-26 PROCEDURE — G0463 HOSPITAL OUTPT CLINIC VISIT: HCPCS

## 2018-03-26 RX ORDER — BENAZEPRIL HYDROCHLORIDE 10 MG/1
TABLET ORAL
Qty: 90 TABLET | Refills: 0 | Status: SHIPPED | OUTPATIENT
Start: 2018-03-26 | End: 2018-05-11 | Stop reason: SDUPTHER

## 2018-05-11 RX ORDER — BENAZEPRIL HYDROCHLORIDE 10 MG/1
TABLET ORAL
Qty: 90 TABLET | Refills: 0 | Status: SHIPPED | OUTPATIENT
Start: 2018-05-11 | End: 2018-08-27 | Stop reason: SDUPTHER

## 2018-05-11 RX ORDER — MELOXICAM 7.5 MG/1
TABLET ORAL
Qty: 90 TABLET | Refills: 0 | Status: SHIPPED | OUTPATIENT
Start: 2018-05-11 | End: 2018-08-27 | Stop reason: SDUPTHER

## 2018-08-07 RX ORDER — PANTOPRAZOLE SODIUM 40 MG/1
40 TABLET, DELAYED RELEASE ORAL DAILY
Qty: 90 TABLET | Refills: 0 | Status: SHIPPED | OUTPATIENT
Start: 2018-08-07 | End: 2018-08-27 | Stop reason: SDUPTHER

## 2018-08-07 NOTE — TELEPHONE ENCOUNTER
Left pt a message to call back the office. Pt has a upcoming apt with James Epley on 08/27/2018. Not for sure if he is making Epley his PCP or not.     Last seen 02/05/2018

## 2018-08-27 ENCOUNTER — OFFICE VISIT (OUTPATIENT)
Dept: FAMILY MEDICINE CLINIC | Facility: CLINIC | Age: 68
End: 2018-08-27

## 2018-08-27 VITALS
DIASTOLIC BLOOD PRESSURE: 70 MMHG | HEIGHT: 72 IN | SYSTOLIC BLOOD PRESSURE: 126 MMHG | OXYGEN SATURATION: 96 % | WEIGHT: 252 LBS | HEART RATE: 73 BPM | BODY MASS INDEX: 34.13 KG/M2

## 2018-08-27 DIAGNOSIS — R39.198 DIFFICULTY URINATING: ICD-10-CM

## 2018-08-27 DIAGNOSIS — D69.6 THROMBOCYTOPENIA (HCC): ICD-10-CM

## 2018-08-27 DIAGNOSIS — I10 ESSENTIAL HYPERTENSION: Primary | ICD-10-CM

## 2018-08-27 DIAGNOSIS — E78.00 PURE HYPERCHOLESTEROLEMIA: ICD-10-CM

## 2018-08-27 DIAGNOSIS — N40.0 BENIGN PROSTATIC HYPERPLASIA, UNSPECIFIED WHETHER LOWER URINARY TRACT SYMPTOMS PRESENT: ICD-10-CM

## 2018-08-27 PROCEDURE — 99213 OFFICE O/P EST LOW 20 MIN: CPT | Performed by: NURSE PRACTITIONER

## 2018-08-27 RX ORDER — BENAZEPRIL HYDROCHLORIDE 10 MG/1
10 TABLET ORAL DAILY
Qty: 90 TABLET | Refills: 1 | Status: SHIPPED | OUTPATIENT
Start: 2018-08-27

## 2018-08-27 RX ORDER — MELOXICAM 7.5 MG/1
7.5 TABLET ORAL DAILY
Qty: 90 TABLET | Refills: 1 | Status: SHIPPED | OUTPATIENT
Start: 2018-08-27 | End: 2021-12-02 | Stop reason: HOSPADM

## 2018-08-27 RX ORDER — SIMVASTATIN 40 MG
40 TABLET ORAL NIGHTLY
Qty: 90 TABLET | Refills: 1 | Status: SHIPPED | OUTPATIENT
Start: 2018-08-27

## 2018-08-27 RX ORDER — PANTOPRAZOLE SODIUM 40 MG/1
40 TABLET, DELAYED RELEASE ORAL DAILY
Qty: 90 TABLET | Refills: 1 | Status: SHIPPED | OUTPATIENT
Start: 2018-08-27

## 2018-08-27 RX ORDER — DOXAZOSIN MESYLATE 4 MG/1
4 TABLET ORAL DAILY
Qty: 90 TABLET | Refills: 1 | Status: SHIPPED | OUTPATIENT
Start: 2018-08-27 | End: 2020-07-08

## 2018-08-27 NOTE — PATIENT INSTRUCTIONS
Discharge instructions    Outpatient nonfasting labs soon  Call me for results  If platelets are still low, will refer to hematology    Follow-up urology to evaluate urinary retention enlarged prostate    2000 juan a day  Decrease processed sweets  Increased vegetables  Healthy proteins    Consider vascular screenings as discussed    Follow-up in 6 months    Thank You,      James Epley, NP

## 2018-08-27 NOTE — PROGRESS NOTES
Subjective   Aldair Cordova is a 68 y.o. male.     htn ess    126/70  simvasttin  Baby asa daily      Endoscopy  colonoscopy        Needs new PCP  Hypertension essential controlled  Hyperlipidemia stable   needs refillscompliant with medication  Chronic urinary frequency increased over the last two years  Frequent nocturia  Difficult time sometimes walking into the parking lot [employer]  Dr Marc kelsey    Previous lab CBC  Mild thrombocytopenia platelet count [id number]    Previous EGD  Takes PPI reflux controlled  Try to go off previously  EGD several years ago  No history stenosis esophageal  Or Garcia's to my knowledge or patience  Discuss risk-benefit  Strategy to wean  meloxicam 7.5 mg  Daily risk-benefit risk  Gastro bleeding kidney failure stroke heart attack strategy to minimize gastric protection        Hyperlipidemia     Hypertension          The following portions of the patient's history were reviewed and updated as appropriate: allergies, current medications, past medical history, past social history, past surgical history and problem list.    Review of Systems   Genitourinary: Positive for decreased urine volume and difficulty urinating.   All other systems reviewed and are negative.      Objective   Physical Exam   Constitutional: He is oriented to person, place, and time. He appears well-developed and well-nourished. No distress.   HENT:   Head: Normocephalic and atraumatic.   Nose: Nose normal.   Mouth/Throat: Oropharynx is clear and moist.   Eyes: Pupils are equal, round, and reactive to light. Conjunctivae are normal. No scleral icterus.   Neck: Neck supple. No JVD present. No thyromegaly present.   Cardiovascular: Normal rate, regular rhythm and normal heart sounds.  Exam reveals no gallop and no friction rub.    No murmur heard.  Carotids clear   Pulmonary/Chest: Effort normal and breath sounds normal. No respiratory distress. He has no wheezes. He has no rales.   Abdominal: Soft. Bowel sounds are  normal. He exhibits no distension and no mass. There is no tenderness. There is no guarding. No hernia.   Musculoskeletal: He exhibits no edema or tenderness.   Lymphadenopathy:     He has no cervical adenopathy.   Neurological: He is alert and oriented to person, place, and time. He has normal reflexes.   Skin: Skin is warm and dry. No rash noted. He is not diaphoretic. No erythema.   Psychiatric: He has a normal mood and affect. His behavior is normal. Judgment and thought content normal.   Vitals reviewed.        Assessment/Plan   Aldair was seen today for hyperlipidemia and hypertension.    Diagnoses and all orders for this visit:    Essential hypertension  -     CBC & Differential  -     TSH Rfx On Abnormal To Free T4    Difficulty urinating  -     Ambulatory Referral to Urology  -     CBC & Differential  -     TSH Rfx On Abnormal To Free T4    Benign prostatic hyperplasia, unspecified whether lower urinary tract symptoms present  -     Ambulatory Referral to Urology  -     CBC & Differential  -     TSH Rfx On Abnormal To Free T4    Pure hypercholesterolemia  -     CBC & Differential  -     TSH Rfx On Abnormal To Free T4    Thrombocytopenia (CMS/HCC)  -     CBC & Differential  -     TSH Rfx On Abnormal To Free T4    Other orders  -     benazepril (LOTENSIN) 10 MG tablet; Take 1 tablet by mouth Daily.  -     doxazosin (CARDURA) 4 MG tablet; Take 1 tablet by mouth Daily.  -     meloxicam (MOBIC) 7.5 MG tablet; Take 1 tablet by mouth Daily.  -     simvastatin (ZOCOR) 40 MG tablet; Take 1 tablet by mouth Every Night.  -     pantoprazole (PROTONIX) 40 MG EC tablet; Take 1 tablet by mouth Daily.                  Discharge instructions    Outpatient nonfasting labs soon  Call me for results  If platelets are still low, will refer to hematology    Follow-up urology to evaluate urinary retention enlarged prostate    2000 juan a day  Decrease processed sweets  Increased vegetables  Healthy proteins    Consider vascular  screenings as discussed    Follow-up in 6 months    Thank You,      James Epley,  NP

## 2018-09-20 DIAGNOSIS — E03.8 SUBCLINICAL HYPOTHYROIDISM: Primary | ICD-10-CM

## 2018-10-20 ENCOUNTER — RESULTS ENCOUNTER (OUTPATIENT)
Dept: FAMILY MEDICINE CLINIC | Facility: CLINIC | Age: 68
End: 2018-10-20

## 2018-10-20 DIAGNOSIS — E03.8 SUBCLINICAL HYPOTHYROIDISM: ICD-10-CM

## 2019-04-15 ENCOUNTER — OFFICE VISIT (OUTPATIENT)
Dept: SLEEP MEDICINE | Facility: HOSPITAL | Age: 69
End: 2019-04-15
Attending: INTERNAL MEDICINE

## 2019-04-15 VITALS
WEIGHT: 221 LBS | DIASTOLIC BLOOD PRESSURE: 73 MMHG | HEIGHT: 72 IN | SYSTOLIC BLOOD PRESSURE: 123 MMHG | BODY MASS INDEX: 29.93 KG/M2

## 2019-04-15 DIAGNOSIS — G47.33 OSA ON CPAP: Primary | ICD-10-CM

## 2019-04-15 DIAGNOSIS — I10 ESSENTIAL HYPERTENSION: ICD-10-CM

## 2019-04-15 DIAGNOSIS — E66.9 OBESITY (BMI 30-39.9): ICD-10-CM

## 2019-04-15 DIAGNOSIS — Z99.89 OSA ON CPAP: Primary | ICD-10-CM

## 2019-04-15 DIAGNOSIS — Z72.820 SLEEP DEPRIVATION: ICD-10-CM

## 2019-04-15 DIAGNOSIS — G47.34 SLEEP RELATED HYPOXIA: ICD-10-CM

## 2019-04-15 DIAGNOSIS — G47.26 SHIFT WORK SLEEP DISORDER: ICD-10-CM

## 2019-04-15 DIAGNOSIS — G47.30 HYPERSOMNIA WITH SLEEP APNEA: ICD-10-CM

## 2019-04-15 DIAGNOSIS — G47.10 HYPERSOMNIA WITH SLEEP APNEA: ICD-10-CM

## 2019-04-15 PROCEDURE — G0463 HOSPITAL OUTPT CLINIC VISIT: HCPCS

## 2019-04-15 NOTE — PROGRESS NOTES
"  Sleep Disorder Follow Up    Patient Name: Aldair Cordova  Age/Sex: 69 y.o. male  : 1950  MRN: [id number]    Date of Encounter Visit: 04/15/2019  Referring Provider: Harjit Tran MD  Place of Service: [facility]  Patient Care Team:  Agustin Guerra MD as PCP - General (Internal Medicine)    PROBLEM LIST:  1. Moderate KIEL with severe REM Component on CPAP  2. Sleep related hypoxia   3. Hypersomnia   4. Hypertension   5. Sleep deprivation with sleep maintenance insomnia    History of Present Illness:  Aldair Cordova is a 69 y.o. male who was last seen in the office on 3/26/18 for sleep apnea and insomnia. He has a history of moderate KIEL with severe REM component diagnosed in  and was treated with CPAP at 7 cmH20. At follow up on 18 he was ordered a new machine and since he gained 20 pounds his pressure was changed to auto 7-14 cmH20. His [employer] reviewed his auto CPAP and changed him to a set pressure of 13.5 cmH20 and he has been on that pressure since.     He changed his PCP to Dr Villafana, no other changes in his medical or surgical history     He does have chronic sleep deprivation that has improved.   He works night shift and has sleep maintenance insomnia requiring naps at times, he is supposed to retire this coming 2020.     Since last visit, he is doing well with his new machine and changes to his pressure. He did acquire a \"so clean\" machine and likes it and plans to continue to use it. He is working night shift.   Patient is on CPAP and uses it every night with no complaints from the mask or the pressure.  Patient uses a full face  mask, which does fit well. Patient denies any air leak or dry mouth.   Patient's equipment supplier is [email or web address].  Patient sleeps better and has a deeper sleep with the CPAP and feels more energy during the day time.  Current CPAP setting 13.5 cm H20. He has a \" so clean\" machine to help with cleaning   [place] " Sleepiness Scale (ESS) is 9.  Compliance download was reviewed and documented below.  Weight is down by 29 poundssince last visit. The weight loss is caused by cutting down on sweets and caffein  Other comorbidities include HTN,HLD, GERD and obesity    Review of Systems:   A ten-system review was conducted and was negative.   Please refer to the follow up sleep questionnaire page one for details.    Past Medical History:  Past medical, surgical, social, and family history, except as mentioned above, was unchanged from the last visit.     Past Medical History:   Diagnosis Date   • Acute pain of left knee 11/7/2016   • Anxiety 2/2/2016   • Benign essential hypertension    • GERD (gastroesophageal reflux disease)    • HLD (hyperlipidemia)    • Nocturia 2/2/2016   • Obesity (BMI 30-39.9) 1/29/2018   • Osteoarthrosis, localized, primary, knee 12/15/2016   • Sleep deprivation 1/29/2018   • Thrombocytopenia (CMS/HCC) 2/8/2016       Past Surgical History:   Procedure Laterality Date   • COLONOSCOPY  10/21/2016   • KIDNEY STONE SURGERY     • KNEE SURGERY         Home Medications:     Current Outpatient Medications:   •  aspirin 81 MG tablet, Take by mouth., Disp: , Rfl:   •  benazepril (LOTENSIN) 10 MG tablet, Take 1 tablet by mouth Daily., Disp: 90 tablet, Rfl: 1  •  doxazosin (CARDURA) 4 MG tablet, Take 1 tablet by mouth Daily., Disp: 90 tablet, Rfl: 1  •  Magnesium 250 MG tablet, Take 400 mg by mouth., Disp: , Rfl:   •  meloxicam (MOBIC) 7.5 MG tablet, Take 1 tablet by mouth Daily., Disp: 90 tablet, Rfl: 1  •  Multiple Vitamins-Minerals (MULTI COMPLETE) capsule, Take by mouth daily., Disp: , Rfl:   •  Omega-3 Fatty Acids (EQL FISH OIL) 1000 MG capsule, Take by mouth daily., Disp: , Rfl:   •  pantoprazole (PROTONIX) 40 MG EC tablet, Take 1 tablet by mouth Daily., Disp: 90 tablet, Rfl: 1  •  simvastatin (ZOCOR) 40 MG tablet, TAKE 1 TABLET DAILY PLEASE MAKE AN APPOINTMENT WITH   YOUR DOCTOR, Disp: 90 tablet, Rfl: 0  •   "simvastatin (ZOCOR) 40 MG tablet, Take 1 tablet by mouth Every Night., Disp: 90 tablet, Rfl: 1  •  TURMERIC PO, Take 538 mg by mouth., Disp: , Rfl:     Current Facility-Administered Medications:   •  hylan (SYNVISC ONE) injection 48 mg, 48 mg, Intra-articular, Once, Rosa Irizarry MD    Allergies:  No Known Allergies    Past Social History:  Social History     Socioeconomic History   • Marital status:      Spouse name: Not on file   • Number of children: Not on file   • Years of education: Not on file   • Highest education level: Not on file   Occupational History   • Occupation: [employer]    Tobacco Use   • Smoking status: Never Smoker   • Smokeless tobacco: Never Used   Substance and Sexual Activity   • Alcohol use: No   • Drug use: No     Comment: drinks caffeine on occasion   • Sexual activity: Defer       Past Family History:  Family History   Problem Relation Age of Onset   • Cancer Mother         lung   • Sleep apnea Other    • Lung cancer Other          Objective:   Done and documented on sleep disorders center physical examination sheet, please refer to hand written note on the chart for details about the other pertinent negative findings.    Vital Signs:   Visit Vitals  /73   Ht 182.9 cm (72\")   Wt 100 kg (221 lb)   BMI 29.97 kg/m²     Wt Readings from Last 3 Encounters:   04/15/19 100 kg (221 lb)   08/27/18 114 kg (252 lb)   03/26/18 113 kg (250 lb)          Physical Exam:   General: AAOx3. Normal mood and affect.   HEENT:  Moist mucous membranes.  Septum midline. Mallampati 4 airway.  Visible but not significantly enlarged tonsils.   LUNGS: Non-labored breathing. CTAB. No wheezes.  HEART: Regular rate and rhythm. No murmur. Normal s1/s2  EXTREMITIES: no edema. No cyanosis or clubbing. Normal gait    Diagnostic Data:  NPSG split night: 2/4/09 at [facility] [place] at a weight of 230 pounds. Moderate to severe KIEL with AHI of 24.4 that increased to 53.3 during REM sleep. 2.7% of " TST spent < 90% saturation. Arousal index 20. Controlled on CPAP at 7 cmH20.    New machine ordered on 1/29/18 and with weight gain of 20 pounds he was put on auto CPAP 7-14 and [employer] reviewed auto titration and put on set pressure of 13.5 cmH20.     Compliance download from 3/16/19-4/14/19 showed 100% compliance with average use of 5 hours and 48 minutes per night. Residual AHI of 0.3 on CPAP at 13.5 cm H20 with 18 seconds of average time in large air leak per day.     Assessment and Plan:       ICD-10-CM ICD-9-CM   1. KIEL on CPAP G47.33 327.23    Z99.89 V46.8   2. Essential hypertension I10 401.9   3. Sleep related hypoxia G47.34 327.24   4. Obesity (BMI 30-39.9) E66.9 278.00   5. Hypersomnia with sleep apnea G47.10 780.53    G47.30    6. 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 Sleep deprivation Z72.820 V69.4       Recommendations:     Patient is compliant and has good control of KIEL on CPAP with clinical and subjective improvement.  CPAP is recommended to be continued.     Excessive daytime sleepiness is still present and likely due to sleep deprivation and previously under treated apneas. Continue CPAP and will reassess.     Given the 29 pounds weight loss and the fact that he required lower pressure in the past before he gained the weight, we can safely reduce his pressure and we will drop it down to 10 cm H2O with a plan to consider further reduction if he can achieve further weight loss on the follow-up visit    Patient has shift work disorder with sleep deprivation and sleep maintenance insomnia and was encouraged to increase time in bed and reviewed sleep hygiene measures.  Patient is supposed to retire in January 2020 and we expect his sleep time and sleep quality to improve significantly afterwards        No orders of the defined types were placed in this encounter.    Return in about 1 year (around 4/15/2020).    Harjit Tran MD   [facility]   04/15/19  9:56 AM    Dictated  utilizing Dragon dictation:  Much of this encounter note is an electronic transcription/translation of spoken language to printed text. The electronic translation of spoken language may permit erroneous, or at times, nonsensical words or phrases to be inadvertently transcribed; Although I have reviewed the note for such errors, some may still exist.

## 2020-02-24 ENCOUNTER — CONSULT (OUTPATIENT)
Dept: ORTHOPEDIC SURGERY | Facility: CLINIC | Age: 70
End: 2020-02-24

## 2020-02-24 VITALS — BODY MASS INDEX: 33.93 KG/M2 | TEMPERATURE: 98 F | HEIGHT: 70 IN | WEIGHT: 237 LBS

## 2020-02-24 DIAGNOSIS — R52 PAIN: Primary | ICD-10-CM

## 2020-02-24 DIAGNOSIS — M17.10 PRIMARY LOCALIZED OSTEOARTHROSIS OF LOWER LEG, UNSPECIFIED LATERALITY: ICD-10-CM

## 2020-02-24 PROCEDURE — 99204 OFFICE O/P NEW MOD 45 MIN: CPT | Performed by: ORTHOPAEDIC SURGERY

## 2020-02-24 PROCEDURE — 73562 X-RAY EXAM OF KNEE 3: CPT | Performed by: ORTHOPAEDIC SURGERY

## 2020-02-24 PROCEDURE — 20610 DRAIN/INJ JOINT/BURSA W/O US: CPT | Performed by: ORTHOPAEDIC SURGERY

## 2020-02-24 RX ADMIN — METHYLPREDNISOLONE ACETATE 80 MG: 80 INJECTION, SUSPENSION INTRA-ARTICULAR; INTRALESIONAL; INTRAMUSCULAR; SOFT TISSUE at 14:08

## 2020-02-24 NOTE — PROGRESS NOTES
New Bilateral Knee      Patient: Aldair Cordova        YOB: 1950    Medical Record Number: [id number]        Chief Complaints: bilateral knee pain      History of Present Illness: This is a 70-year-old male who presents complaining of bilateral knee pain right greater than left he states the right is been hurting since an injury in high school intermittent has worsened over time it is symptomatically worse in the left no new history injury change in activity does have swelling has night pain symptoms are moderate constant stabbing aching burning clicking worse with standing and driving better with ice rest and prior injections he is in package handling at [employer] past medical history smart for kidney disease sleep apnea      Allergies:   Allergies   Allergen Reactions   • Xanax [Alprazolam] Other (See Comments)     Makes me mean       Medications:   Home Medications:  Current Outpatient Medications on File Prior to Visit   Medication Sig   • aspirin 81 MG tablet Take by mouth.   • benazepril (LOTENSIN) 10 MG tablet Take 1 tablet by mouth Daily.   • Magnesium 250 MG tablet Take 400 mg by mouth.   • meloxicam (MOBIC) 7.5 MG tablet Take 1 tablet by mouth Daily.   • Multiple Vitamins-Minerals (MULTI COMPLETE) capsule Take by mouth daily.   • Omega-3 Fatty Acids (EQL FISH OIL) 1000 MG capsule Take by mouth daily.   • pantoprazole (PROTONIX) 40 MG EC tablet Take 1 tablet by mouth Daily.   • simvastatin (ZOCOR) 40 MG tablet TAKE 1 TABLET DAILY PLEASE MAKE AN APPOINTMENT WITH   YOUR DOCTOR   • TURMERIC PO Take 538 mg by mouth.   • doxazosin (CARDURA) 4 MG tablet Take 1 tablet by mouth Daily.   • simvastatin (ZOCOR) 40 MG tablet Take 1 tablet by mouth Every Night.     Current Facility-Administered Medications on File Prior to Visit   Medication   • hylan (SYNVISC ONE) injection 48 mg     Current Medications:  Scheduled Meds:    hylan 48 mg Intra-articular Once     Continuous Infusions:   PRN Meds:.    Past  "Medical History:   Diagnosis Date   • Acute pain of left knee 11/7/2016   • Anxiety 2/2/2016   • Benign essential hypertension    • GERD (gastroesophageal reflux disease)    • HLD (hyperlipidemia)    • Nocturia 2/2/2016   • Obesity (BMI 30-39.9) 1/29/2018   • Osteoarthrosis, localized, primary, knee 12/15/2016   • Sleep deprivation 1/29/2018   • Thrombocytopenia (CMS/HCC) 2/8/2016        Past Surgical History:   Procedure Laterality Date   • COLONOSCOPY  10/21/2016   • KIDNEY STONE SURGERY     • KNEE SURGERY          Social History     Occupational History   • Occupation: fork    Tobacco Use   • Smoking status: Never Smoker   • Smokeless tobacco: Never Used   Substance and Sexual Activity   • Alcohol use: No   • Drug use: No     Comment: drinks caffeine on occasion   • Sexual activity: Defer      Social History     Social History Narrative   • Not on file        Family History   Problem Relation Age of Onset   • Cancer Mother         lung   • Sleep apnea Other    • Lung cancer Other              Review of Systems: 14 point review of systems are remarkable for the pertinent positives listed in the chart by the patient the remainder negative    Review of Systems      Physical Exam: 70 y.o. male  General Appearance:    Alert, cooperative, in no acute distress                   Vitals:    02/24/20 1338   Temp: 98 °F (36.7 °C)   Weight: 108 kg (237 lb)   Height: 177.8 cm (70\")   PainSc:   5      Patient is alert and read ×3 no acute distress appears her above-listed at height weight and age.  Affect is normal respiratory rate is normal unlabored. Heart rate regular rate rhythm, sclera, dentition and hearing are normal for the purpose of this exam.        Ortho Exam Physical exam of the right knee reveals no effusion, no erythema.  It mild loss of extension and full flexion  Patient has mild varus alignment.  They have mild tenderness to palpation about the medial compartment, no tenderness laterally..  The " patient has a negative bounce home, negative Jaja and a stable ligamentous exam.  Quad tone is reasonable and symmetric.  There are no overlying skin changes no lymphedema no lymphadenopathy.  There is good hip range of motion which is full symmetric and asymptomatic and a normal ankle exam.      physical exam the left knee she has mild effusion no overlying skin changes no lymphedema no lymphadenopathy.  She is sitting in a genu valgum position.  She has -5° of extension flexion is to 125 she has palpable tenderness laterally more than medially and crepitus with range of motion.  Her calf is soft and nontender    Large Joint Arthrocentesis: R knee  Date/Time: 2/24/2020 2:08 PM  Consent given by: patient  Site marked: site marked  Timeout: Immediately prior to procedure a time out was called to verify the correct patient, procedure, equipment, support staff and site/side marked as required   Supporting Documentation  Indications: pain   Procedure Details  Location: knee - R knee  Preparation: Patient was prepped and draped in the usual sterile fashion  Needle size: 22 G  Approach: anteromedial  Medications administered: 80 mg methylPREDNISolone acetate 80 MG/ML; 4 mL lidocaine (cardiac)  Patient tolerance: patient tolerated the procedure well with no immediate complications    Large Joint Arthrocentesis: L knee  Date/Time: 2/24/2020 2:08 PM  Consent given by: patient  Site marked: site marked  Timeout: Immediately prior to procedure a time out was called to verify the correct patient, procedure, equipment, support staff and site/side marked as required   Supporting Documentation  Indications: pain   Procedure Details  Location: knee - L knee  Preparation: Patient was prepped and draped in the usual sterile fashion  Needle size: 22 G  Approach: anteromedial  Medications administered: 4 mL lidocaine (cardiac); 80 mg methylPREDNISolone acetate 80 MG/ML  Patient tolerance: patient tolerated the procedure well with  no immediate complications                   Radiology:   AP, Lateral and merchant views of the right and left knee  were ordered/reviewed to evauateknee pain.  I have no comparative films these show severe medial compartment OA with near complete loss of joint space on the right he has severe lateral compartment OA on the left also has moderate to severe bilateral patellofemoral OA  Imaging Results (Most Recent)     Procedure Component Value Units Date/Time    XR Knee 3 View Bilateral [[id number]] Resulted:  02/24/20 1335     Updated:  02/24/20 1335    Impression:       Ordering physician's impression is located in the Encounter Note dated 02/24/20. X-ray performed in the DR room.          Assessment/Plan:    Bilateral knee DJD I think he is at his endpoint he wishes to proceed the next step I will inject him today to give him some relief I will start him into  prehab and have him see Dr. Fernández

## 2020-02-27 RX ORDER — METHYLPREDNISOLONE ACETATE 80 MG/ML
80 INJECTION, SUSPENSION INTRA-ARTICULAR; INTRALESIONAL; INTRAMUSCULAR; SOFT TISSUE
Status: COMPLETED | OUTPATIENT
Start: 2020-02-24 | End: 2020-02-24

## 2020-03-24 ENCOUNTER — CONSULT (OUTPATIENT)
Dept: ORTHOPEDIC SURGERY | Facility: CLINIC | Age: 70
End: 2020-03-24

## 2020-03-24 ENCOUNTER — TELEPHONE (OUTPATIENT)
Dept: ORTHOPEDIC SURGERY | Facility: CLINIC | Age: 70
End: 2020-03-24

## 2020-03-24 VITALS — BODY MASS INDEX: 31.42 KG/M2 | WEIGHT: 232 LBS | TEMPERATURE: 98.3 F | HEIGHT: 72 IN

## 2020-03-24 DIAGNOSIS — M17.11 PRIMARY OSTEOARTHRITIS OF RIGHT KNEE: Primary | ICD-10-CM

## 2020-03-24 PROBLEM — M17.9 OA (OSTEOARTHRITIS) OF KNEE: Status: ACTIVE | Noted: 2020-03-24

## 2020-03-24 PROCEDURE — 99214 OFFICE O/P EST MOD 30 MIN: CPT | Performed by: ORTHOPAEDIC SURGERY

## 2020-03-24 RX ORDER — CEFAZOLIN SODIUM 2 G/100ML
2 INJECTION, SOLUTION INTRAVENOUS ONCE
Status: CANCELLED | OUTPATIENT
Start: 2020-07-01 | End: 2020-03-24

## 2020-03-24 RX ORDER — MELOXICAM 15 MG/1
15 TABLET ORAL ONCE
Status: CANCELLED | OUTPATIENT
Start: 2020-07-01 | End: 2020-03-24

## 2020-03-24 RX ORDER — PREGABALIN 75 MG/1
150 CAPSULE ORAL ONCE
Status: CANCELLED | OUTPATIENT
Start: 2020-07-01 | End: 2020-03-24

## 2020-03-24 RX ORDER — TAMSULOSIN HYDROCHLORIDE 0.4 MG/1
1 CAPSULE ORAL DAILY
COMMUNITY
Start: 2020-03-17

## 2020-03-24 NOTE — PROGRESS NOTES
Patient: Aldair Cordova  YOB: 1950 70 y.o. male  Medical Record Number: [id number]    Chief Complaints:   Chief Complaint   Patient presents with   • Right Knee - Consult       History of Present Illness:Aldair Cordova is a 70 y.o. male who presents with severe right medial knee pain he is a stabbing aching pain with any activity is slowly progressed over many years to the point now he is limited in basic activities of daily living.  He has had previous injections and done physical therapy as well as taken anti-inflammatories despite this his symptoms have progressed to the point where he is severely limited.    Allergies:   Allergies   Allergen Reactions   • Xanax [Alprazolam] Other (See Comments)     Makes me mean       Medications:   Current Outpatient Medications   Medication Sig Dispense Refill   • aspirin 81 MG tablet Take by mouth.     • benazepril (LOTENSIN) 10 MG tablet Take 1 tablet by mouth Daily. 90 tablet 1   • doxazosin (CARDURA) 4 MG tablet Take 1 tablet by mouth Daily. 90 tablet 1   • Magnesium 250 MG tablet Take 400 mg by mouth.     • meloxicam (MOBIC) 7.5 MG tablet Take 1 tablet by mouth Daily. 90 tablet 1   • Multiple Vitamins-Minerals (MULTI COMPLETE) capsule Take by mouth daily.     • Omega-3 Fatty Acids (EQL FISH OIL) 1000 MG capsule Take by mouth daily.     • pantoprazole (PROTONIX) 40 MG EC tablet Take 1 tablet by mouth Daily. 90 tablet 1   • simvastatin (ZOCOR) 40 MG tablet TAKE 1 TABLET DAILY PLEASE MAKE AN APPOINTMENT WITH   YOUR DOCTOR 90 tablet 0   • simvastatin (ZOCOR) 40 MG tablet Take 1 tablet by mouth Every Night. 90 tablet 1   • TURMERIC PO Take 538 mg by mouth.     • tamsulosin (FLOMAX) 0.4 MG capsule 24 hr capsule        Current Facility-Administered Medications   Medication Dose Route Frequency Provider Last Rate Last Dose   • hylan (SYNVISC ONE) injection 48 mg  48 mg Intra-articular Once Rosa Irizarry MD             The following portions of the  "patient's history were reviewed and updated as appropriate: allergies, current medications, past family history, past medical history, past social history, past surgical history and problem list.    Review of Systems:   A 14 point review of systems was performed. All systems negative except pertinent positives/negative listed in HPI above    Physical Exam:   Vitals:    03/24/20 1412   Temp: 98.3 °F (36.8 °C)   TempSrc: Temporal   Weight: 105 kg (232 lb)   Height: 182.9 cm (72\")       General: A and O x 3, ASA, NAD    SCLERA:    Normal    DENTITION:   Normal   Knee:  right    ALIGNMENT:     Varus  ,   Patella  tracks  midline    GAIT:    Antalgic    SKIN:    No abnormality    RANGE OF MOTION:   2  -  120   DEG    STRENGTH:   4  / 5    LIGAMENTS:    No varus / valgus instability.   Negative  Lachman.    MENISCUS:     Negative   Jaja       DISTAL PULSES:    Paplable    DISTAL SENSATION :   Intact    LYMPHATICS:     No   lymphadenopathy    OTHER:          - Positive   effusion      - Crepitance with ROM   Knee:  left    ALIGNMENT:     Valgus      GAIT:    Antalgic    SKIN:    No abnormality    RANGE OF MOTION:   0  -  120   DEG    STRENGTH:   4  / 5    LIGAMENTS:    No varus / valgus instability.   Negative  Lachman.    MENISCUS:     Negative   Jaja       DISTAL PULSES:    Paplable    DISTAL SENSATION :   Intact    LYMPHATICS:     No   lymphadenopathy    OTHER:          - Positive   effusion      - Crepitance with ROM         Radiology:  Xrays 3views both knees  (ap,lateral, sunrise) taken previously demonstrating advancedright knee  varus osteoarthritis with bone on bone articulation, subchondral cysts, and periarticular osteophytes and advanced left knee  valgus osteoarthritis with bone on bone articulation, subchondral cysts, and periarticular osteophytes    Assessment/Plan: Severe right knee varus osteoarthritis and left knee valgus osteoarthritis both appear end-stage.  He is undergone injections physical " therapy and anti-inflammatories without relief of symptoms.  Continuation of conservative management vs. TKA discussed.  The patient wishes to proceed with total knee replacement.  At this point the patient has failed the full compliment of conservative treatment and stating complete understanding of the risks/benefits/ anternatives wishes to proceed with surgical treatment.    Risk and benefits of surgery were reviewed.  Including, but not limited to, blood clots or pulmonary embolism, anesthesia risk, infection, fracture, skin/leg numbness, persistent pain/crepitance/popping/catching, failure of the implant, need for future surgeries, hematoma, possible nerve or blood vessel injury, need for transfusion, and potential risk of stroke,heart attack or death, among others.  The patient understands and wishes to proceed.     It was explained that if tissue has been repaired or reconstructed, there is also an increased chance of failure which may require further management.  Following the completion of the discussion, the patient expressed understanding of this planned course of care, all their questions were answered and consent will be obtained preoperatively.    Operative Plan: right [employer] Total Knee Replacement an overnight staywith home health rehab        Rehan Fernández MD  3/24/2020

## 2020-03-24 NOTE — PROGRESS NOTES
Patient: Aldair Cordova  YOB: 1950 70 y.o. male  Medical Record Number: [id number]    Chief Complaints:   Chief Complaint   Patient presents with   • Right Knee - Consult       History of Present Illness:Aldair Cordova is a 70 y.o. male who presents with ***    Allergies:   Allergies   Allergen Reactions   • Xanax [Alprazolam] Other (See Comments)     Makes me mean       Medications:   Current Outpatient Medications   Medication Sig Dispense Refill   • aspirin 81 MG tablet Take by mouth.     • benazepril (LOTENSIN) 10 MG tablet Take 1 tablet by mouth Daily. 90 tablet 1   • doxazosin (CARDURA) 4 MG tablet Take 1 tablet by mouth Daily. 90 tablet 1   • Magnesium 250 MG tablet Take 400 mg by mouth.     • meloxicam (MOBIC) 7.5 MG tablet Take 1 tablet by mouth Daily. 90 tablet 1   • Multiple Vitamins-Minerals (MULTI COMPLETE) capsule Take by mouth daily.     • Omega-3 Fatty Acids (EQL FISH OIL) 1000 MG capsule Take by mouth daily.     • pantoprazole (PROTONIX) 40 MG EC tablet Take 1 tablet by mouth Daily. 90 tablet 1   • simvastatin (ZOCOR) 40 MG tablet TAKE 1 TABLET DAILY PLEASE MAKE AN APPOINTMENT WITH   YOUR DOCTOR 90 tablet 0   • simvastatin (ZOCOR) 40 MG tablet Take 1 tablet by mouth Every Night. 90 tablet 1   • TURMERIC PO Take 538 mg by mouth.     • tamsulosin (FLOMAX) 0.4 MG capsule 24 hr capsule        Current Facility-Administered Medications   Medication Dose Route Frequency Provider Last Rate Last Dose   • hylan (SYNVISC ONE) injection 48 mg  48 mg Intra-articular Once Rosa Irizarry MD             The following portions of the patient's history were reviewed and updated as appropriate: allergies, current medications, past family history, past medical history, past social history, past surgical history and problem list.    Review of Systems:   A 14 point review of systems was performed. All systems negative except pertinent positives/negative listed in HPI above    Physical Exam:  "  Vitals:    03/24/20 1412   Temp: 98.3 °F (36.8 °C)   TempSrc: Temporal   Weight: 105 kg (232 lb)   Height: 182.9 cm (72\")       General: A and O x 3, ASA, NAD    SCLERA:    Normal    DENTITION:   Normal         Radiology:  Xrays 3views (ap,lateral, sunrise) {ordered / reviewed knee:72800} demonstrating {xray findings knee:75030}    Assessment/Plan: ***      Rehan Fernández MD  3/24/2020  "

## 2020-05-29 PROBLEM — M17.11 PRIMARY OSTEOARTHRITIS OF RIGHT KNEE: Status: ACTIVE | Noted: 2020-05-29

## 2020-07-02 ENCOUNTER — OFFICE VISIT (OUTPATIENT)
Dept: ORTHOPEDIC SURGERY | Facility: CLINIC | Age: 70
End: 2020-07-02

## 2020-07-02 VITALS — BODY MASS INDEX: 31.69 KG/M2 | TEMPERATURE: 97.6 F | HEIGHT: 72 IN | WEIGHT: 234 LBS

## 2020-07-02 DIAGNOSIS — M17.11 PRIMARY OSTEOARTHRITIS OF RIGHT KNEE: Primary | ICD-10-CM

## 2020-07-02 PROCEDURE — S0260 H&P FOR SURGERY: HCPCS | Performed by: NURSE PRACTITIONER

## 2020-07-02 NOTE — H&P (VIEW-ONLY)
Patient: Aldair Cordova    Date of Admission: 7/10/2020    YOB: 1950    Medical Record Number: [id number]    Admitting Physician: Dr. Rehan Fernández    Reason for Admission: End Stage Right Knee OA    History of Present Illness: 70 y.o. male presents with severe end stage knee osteoarthritis which has not been responsive to the full compliment of conservative measures. Despite conservative attempts, there is still severe, constant activity limiting pain. Given the severity of the pain, the patient has elected to proceed with knee replacement.    Allergies:   Allergies   Allergen Reactions   • Xanax [Alprazolam] Other (See Comments)     Makes me mean         Current Medications:  Home Medications:    Current Outpatient Medications on File Prior to Visit   Medication Sig   • aspirin 81 MG tablet Take by mouth.   • benazepril (LOTENSIN) 10 MG tablet Take 1 tablet by mouth Daily.   • doxazosin (CARDURA) 4 MG tablet Take 1 tablet by mouth Daily.   • Magnesium 250 MG tablet Take 400 mg by mouth.   • meloxicam (MOBIC) 7.5 MG tablet Take 1 tablet by mouth Daily.   • Multiple Vitamins-Minerals (MULTI COMPLETE) capsule Take by mouth daily.   • Omega-3 Fatty Acids (EQL FISH OIL) 1000 MG capsule Take by mouth daily.   • pantoprazole (PROTONIX) 40 MG EC tablet Take 1 tablet by mouth Daily.   • simvastatin (ZOCOR) 40 MG tablet TAKE 1 TABLET DAILY PLEASE MAKE AN APPOINTMENT WITH   YOUR DOCTOR   • simvastatin (ZOCOR) 40 MG tablet Take 1 tablet by mouth Every Night.   • tamsulosin (FLOMAX) 0.4 MG capsule 24 hr capsule    • TURMERIC PO Take 538 mg by mouth.     Current Facility-Administered Medications on File Prior to Visit   Medication   • hylan (SYNVISC ONE) injection 48 mg     PRN Meds:.    PMH:     Past Medical History:   Diagnosis Date   • Acute pain of left knee 11/7/2016   • Anxiety 2/2/2016   • Benign essential hypertension    • GERD (gastroesophageal reflux disease)    • HLD (hyperlipidemia)    • Nocturia  "2/2/2016   • Obesity (BMI 30-39.9) 1/29/2018   • Osteoarthrosis, localized, primary, knee 12/15/2016   • Sleep deprivation 1/29/2018   • Thrombocytopenia (CMS/HCC) 2/8/2016       PF/Surg/Soc Hx:     Past Surgical History:   Procedure Laterality Date   • COLONOSCOPY  10/21/2016   • KIDNEY STONE SURGERY     • KNEE SURGERY          Social History     Occupational History   • Occupation: fork    Tobacco Use   • Smoking status: Never Smoker   • Smokeless tobacco: Never Used   Substance and Sexual Activity   • Alcohol use: No   • Drug use: No     Comment: drinks caffeine on occasion   • Sexual activity: Defer      Social History     Social History Narrative   • Not on file        Family History   Problem Relation Age of Onset   • Cancer Mother         lung   • Sleep apnea Other    • Lung cancer Other          Review of Systems:   A 14 point review of systems was performed, pertinent positives discussed above, all other systems are negative    Physical Exam: 70 y.o. male  Vital Signs :   Vitals:    07/02/20 1540   Temp: 97.6 °F (36.4 °C)   Weight: 106 kg (234 lb)   Height: 182.9 cm (72\")   PainSc:   6     General: Alert and Oriented x 3, No acute distress.  Psych: mood and affect appropriate; recent and remote memory intact  Eyes: conjunctiva clear; pupils equally round and reactive, sclera nonicteric  CV: no peripheral edema  Resp: normal respiratory effort  Skin: no rashes or wounds; normal turgor  Musculosketetal; pain and crepitance with knee range of motion  Vascular: palpable distal pulses    Xrays:  -3 views (AP, lateral, and sunrise) were reviewed demonstrating end-stage OA with bone on bone articulation.    Assessment:  End-stage Right knee osteoarthritis. Conservative measures have failed.      Plan:  The plan is to proceed with Right Total Knee Replacement. The patient voiced understanding of the risks, benefits, and alternative forms of treatment that were discussed with Dr Fernández at the time of " scheduling.  Patient's plan on going home with home health next day    Luciana Whitaker, APRN  7/2/2020

## 2020-07-07 ENCOUNTER — OFFICE VISIT (OUTPATIENT)
Dept: SLEEP MEDICINE | Facility: HOSPITAL | Age: 70
End: 2020-07-07

## 2020-07-07 VITALS
HEIGHT: 72 IN | HEART RATE: 78 BPM | DIASTOLIC BLOOD PRESSURE: 75 MMHG | SYSTOLIC BLOOD PRESSURE: 130 MMHG | WEIGHT: 235.2 LBS | OXYGEN SATURATION: 97 % | BODY MASS INDEX: 31.86 KG/M2

## 2020-07-07 DIAGNOSIS — G47.33 OBSTRUCTIVE SLEEP APNEA, ADULT: Primary | ICD-10-CM

## 2020-07-07 PROCEDURE — G0463 HOSPITAL OUTPT CLINIC VISIT: HCPCS

## 2020-07-07 NOTE — PROGRESS NOTES
[facility]                          Chief Complaint:   Follow up for obstructive sleep apnea, obesity, HTN.  Preop clearance.    History of present illness:   Subjective      This is a 70 year old male patient with hx of KIEL. He was seen by partner Dr. Tran. He switched care to me. All his problems are new to me.     KIEL:  He was initially diagnosed with KIEL in . AHI= 24/h.     Currently on CPAP on uses it regularly.     Sleep schedule:  -Bedtime: 7 AM  -Sleep latency: Not too long  -Wake up time: 1 PM , does feel refreshed  -Nocturnal awakenin times because of nocturia.  No difficulties going back to sleep.  -Perceived sleep hours: 4h    Mask: FFM which does fit well.  No significant air leak or dry mouth  DME: [facility]    ESS: Total score: 9     Obesity:  He gained 14 Lb since last visit. No exercise or special diet.     Preop clearance:  He's going to have a complete right knee replacement next 20. He had a cyst removal from his head in , in outpatient settings. No breathing complications after that.   He had colonoscopy 3 years ago under sedation without issues.    HTN:   On benazepril and doxazosin.  Takes his meds regularly.  No side effects.      REVIEW OF SYSTEMS:   HEENT: No nasal congestion or postnasal drip   CARDIOVASCULAR: No chest pain, chest pressure or chest discomfort. No palpitations or edema.   RESPIRATORY: No shortness of breath, cough or sputum.   GASTROINTESTINAL: No abdominal bloating or reflux   NEUROLOGICAL/PSYCHOATRY: No depression nor anxiety    Past Medical History:  Past Medical History:   Diagnosis Date   • Acute pain of left knee 2016   • Anxiety 2016   • Benign essential hypertension    • GERD (gastroesophageal reflux disease)    • HLD (hyperlipidemia)    • Nocturia 2016   • Obesity (BMI 30-39.9) 2018   • Osteoarthrosis, localized, primary, knee 12/15/2016   • Sleep deprivation  "1/29/2018   • Thrombocytopenia (CMS/HCC) 2/8/2016   ,   Past Surgical History:   Procedure Laterality Date   • COLONOSCOPY  10/21/2016   • KIDNEY STONE SURGERY     • KNEE SURGERY     ,   Family History   Problem Relation Age of Onset   • Cancer Mother         lung   • Sleep apnea Other    • Lung cancer Other    ,   Social History     Tobacco Use   • Smoking status: Never Smoker   • Smokeless tobacco: Never Used   Substance Use Topics   • Alcohol use: No   • Drug use: No     Comment: drinks caffeine on occasion    and Allergies:  Xanax [alprazolam]    Medication Review:     Current Outpatient Medications:   •  aspirin 81 MG tablet, Take by mouth., Disp: , Rfl:   •  benazepril (LOTENSIN) 10 MG tablet, Take 1 tablet by mouth Daily., Disp: 90 tablet, Rfl: 1  •  doxazosin (CARDURA) 4 MG tablet, Take 1 tablet by mouth Daily., Disp: 90 tablet, Rfl: 1  •  Magnesium 250 MG tablet, Take 400 mg by mouth., Disp: , Rfl:   •  meloxicam (MOBIC) 7.5 MG tablet, Take 1 tablet by mouth Daily., Disp: 90 tablet, Rfl: 1  •  Multiple Vitamins-Minerals (MULTI COMPLETE) capsule, Take by mouth daily., Disp: , Rfl:   •  Omega-3 Fatty Acids (EQL FISH OIL) 1000 MG capsule, Take by mouth daily., Disp: , Rfl:   •  pantoprazole (PROTONIX) 40 MG EC tablet, Take 1 tablet by mouth Daily., Disp: 90 tablet, Rfl: 1  •  simvastatin (ZOCOR) 40 MG tablet, TAKE 1 TABLET DAILY PLEASE MAKE AN APPOINTMENT WITH   YOUR DOCTOR, Disp: 90 tablet, Rfl: 0  •  simvastatin (ZOCOR) 40 MG tablet, Take 1 tablet by mouth Every Night., Disp: 90 tablet, Rfl: 1  •  tamsulosin (FLOMAX) 0.4 MG capsule 24 hr capsule, , Disp: , Rfl:   •  TURMERIC PO, Take 538 mg by mouth., Disp: , Rfl:     Current Facility-Administered Medications:   •  hylan (SYNVISC ONE) injection 48 mg, 48 mg, Intra-articular, Once, Rosa Irizarry MD        Objective   Vital Signs:  Vitals:    07/07/20 1400   BP: 130/75   Pulse: 78   SpO2: 97%   Weight: 107 kg (235 lb 3.2 oz)   Height: 182.9 cm (72\") "     Body mass index is 31.9 kg/m².          Physical Exam:   General Appearance:    Alert, cooperative, in no acute distress   ENMT:  Freidman score 3, narrow distance in between the posterior pharyngeal pillars.  Mild scalloping of the tongue   Neck:  Large. Trachea midline. No thyromegaly.   Lungs:    Equal but slightly diminished breath sounds bilaterally.  No crackles or wheezing.  Nonlabored breathing    Heart:    Regular rhythm and normal rate, normal S1 and S2, no            Murmur.   Abdomen:     Obese.  Soft.  No tenderness.  No HSM    Integumentary:  No rash or ecchymosis   Neuro:   Conscious, alert, oriented x3. Appropriate mood and affect.    Extremities:   Moves all extremities well, no edema, no cyanosis, no             Redness              Diagnostic data:  NPSG split night: 2/4/09 at [facility] at a weight of 230 pounds. Moderate to severe KIEL with AHI of 24.4 that increased to 53.3 during REM sleep. 2.7% of TST spent < 90% saturation. Arousal index 20. Controlled on CPAP at 7 cmH20.    CPAP download showed:  Date: last 30 days  Usage (days): 100 %  Days used>4h: 86 %  AHI: 0.9/h  Leak: 0 min and 0 seconds  Usage: 5h and 58 min  CPAP: 10 cm H2O    Assessment   1. KIEL, on CPAP 10  2. Obesity (BMI = 32)  3. Essential hypertension  4. Preoperative clearance for knee surgery        PLAN:  Discussed the result of the download.   Patient is compliant with therapy and clinically benefit from treatment.  Patient was encouraged to continue using his CPAP.  Refill supplies.    Counseled for weight loss.  Encouraged to exercise regularly and cut down on carbohydrates.  Discussed that losing weight may decrease the severity of sleep apnea and obviate the need of CPAP therapy.    No current indication for total knee replacement from pulmonary/sleep perspective.  I did emphasize about the importance of bringing his CPAP to the hospital so he can use it postoperatively due to increased risk of respiratory  failure postop secondary to KIEL.  Would recommend minimizing narcotics and use of incentive spirometry and early ambulation.    Continue current BP meds.  Discussed the importance of Pap compliance in the setting of HTN.                This note was dictated utilizing Dragon dictation

## 2020-07-08 ENCOUNTER — APPOINTMENT (OUTPATIENT)
Dept: PREADMISSION TESTING | Facility: HOSPITAL | Age: 70
End: 2020-07-08

## 2020-07-08 VITALS
WEIGHT: 236 LBS | TEMPERATURE: 97.4 F | DIASTOLIC BLOOD PRESSURE: 85 MMHG | SYSTOLIC BLOOD PRESSURE: 145 MMHG | HEART RATE: 70 BPM | RESPIRATION RATE: 18 BRPM | HEIGHT: 72 IN | OXYGEN SATURATION: 99 % | BODY MASS INDEX: 31.97 KG/M2

## 2020-07-08 DIAGNOSIS — M25.562 ACUTE PAIN OF LEFT KNEE: ICD-10-CM

## 2020-07-08 DIAGNOSIS — M17.11 PRIMARY OSTEOARTHRITIS OF RIGHT KNEE: ICD-10-CM

## 2020-07-08 LAB
ANION GAP SERPL CALCULATED.3IONS-SCNC: 9 MMOL/L (ref 5–15)
BILIRUB UR QL STRIP: NEGATIVE
BUN SERPL-MCNC: 13 MG/DL (ref 8–23)
BUN/CREAT SERPL: 14.4 (ref 7–25)
CALCIUM SPEC-SCNC: 9.8 MG/DL (ref 8.6–10.5)
CHLORIDE SERPL-SCNC: 103 MMOL/L (ref 98–107)
CLARITY UR: CLEAR
CO2 SERPL-SCNC: 27 MMOL/L (ref 22–29)
COLOR UR: YELLOW
CREAT SERPL-MCNC: 0.9 MG/DL (ref 0.76–1.27)
DEPRECATED RDW RBC AUTO: 37.9 FL (ref 37–54)
ERYTHROCYTE [DISTWIDTH] IN BLOOD BY AUTOMATED COUNT: 11.9 % (ref 12.3–15.4)
GFR SERPL CREATININE-BSD FRML MDRD: 84 ML/MIN/1.73
GLUCOSE SERPL-MCNC: 106 MG/DL (ref 65–99)
GLUCOSE UR STRIP-MCNC: NEGATIVE MG/DL
HCT VFR BLD AUTO: 43.6 % (ref 37.5–51)
HGB BLD-MCNC: 14.9 G/DL (ref 13–17.7)
HGB UR QL STRIP.AUTO: NEGATIVE
KETONES UR QL STRIP: NEGATIVE
LEUKOCYTE ESTERASE UR QL STRIP.AUTO: NEGATIVE
MCH RBC QN AUTO: 29.7 PG (ref 26.6–33)
MCHC RBC AUTO-ENTMCNC: 34.2 G/DL (ref 31.5–35.7)
MCV RBC AUTO: 86.9 FL (ref 79–97)
NITRITE UR QL STRIP: NEGATIVE
PH UR STRIP.AUTO: <=5 [PH] (ref 5–8)
PLATELET # BLD AUTO: 136 10*3/MM3 (ref 140–450)
PMV BLD AUTO: 10.8 FL (ref 6–12)
POTASSIUM SERPL-SCNC: 4 MMOL/L (ref 3.5–5.2)
PROT UR QL STRIP: NEGATIVE
RBC # BLD AUTO: 5.02 10*6/MM3 (ref 4.14–5.8)
SODIUM SERPL-SCNC: 139 MMOL/L (ref 136–145)
SP GR UR STRIP: 1.03 (ref 1–1.03)
UROBILINOGEN UR QL STRIP: NORMAL
WBC # BLD AUTO: 5.2 10*3/MM3 (ref 3.4–10.8)

## 2020-07-08 PROCEDURE — U0004 COV-19 TEST NON-CDC HGH THRU: HCPCS | Performed by: NURSE PRACTITIONER

## 2020-07-08 PROCEDURE — 80048 BASIC METABOLIC PNL TOTAL CA: CPT | Performed by: ORTHOPAEDIC SURGERY

## 2020-07-08 PROCEDURE — C9803 HOPD COVID-19 SPEC COLLECT: HCPCS | Performed by: NURSE PRACTITIONER

## 2020-07-08 PROCEDURE — 36415 COLL VENOUS BLD VENIPUNCTURE: CPT

## 2020-07-08 PROCEDURE — 93005 ELECTROCARDIOGRAM TRACING: CPT

## 2020-07-08 PROCEDURE — 93010 ELECTROCARDIOGRAM REPORT: CPT | Performed by: INTERNAL MEDICINE

## 2020-07-08 PROCEDURE — 81003 URINALYSIS AUTO W/O SCOPE: CPT | Performed by: ORTHOPAEDIC SURGERY

## 2020-07-08 PROCEDURE — 85027 COMPLETE CBC AUTOMATED: CPT | Performed by: ORTHOPAEDIC SURGERY

## 2020-07-08 ASSESSMENT — KOOS JR
KOOS JR SCORE: 19
KOOS JR SCORE: 39.625

## 2020-07-08 NOTE — DISCHARGE INSTRUCTIONS
Take the following medications the morning of surgery:  PANTOPRAZOLE      General Instructions:  • Do not eat solid food after midnight the night before surgery.  • You may drink clear liquids day of surgery but must stop at least one hour before your hospital arrival time.  • It is beneficial for you to have a clear drink that contains carbohydrates the day of surgery.  We suggest a 12 to 20 ounce bottle of Gatorade or Powerade for non-diabetic patients or a 12 to 20 ounce bottle of G2 or Powerade Zero for diabetic patients. (Pediatric patients, are not advised to drink a 12 to 20 ounce carbohydrate drink)    Clear liquids are liquids you can see through.  Nothing red in color.     Plain water                               Sports drinks  Sodas                                   Gelatin (Jell-O)  Fruit juices without pulp such as white grape juice and apple juice  Popsicles that contain no fruit or yogurt  Tea or coffee (no cream or milk added)  Gatorade / Powerade  G2 / Powerade Zero    • Infants may have breast milk up to four hours before surgery.  • Infants drinking formula may drink formula up to six hours before surgery.   • Patients who avoid smoking, chewing tobacco and alcohol for 4 weeks prior to surgery have a reduced risk of post-operative complications.  Quit smoking as many days before surgery as you can.  • Do not smoke, use chewing tobacco or drink alcohol the day of surgery.   • If applicable bring your C-PAP/ BI-PAP machine.  • Bring any papers given to you in the doctor’s office.  • Wear clean comfortable clothes.  • Do not wear contact lenses, false eyelashes or make-up.  Bring a case for your glasses.   • Bring crutches or walker if applicable.  • Remove all piercings.  Leave jewelry and any other valuables at home.  • Hair extensions with metal clips must be removed prior to surgery.  • The Pre-Admission Testing nurse will instruct you to bring medications if unable to obtain an accurate list in  Pre-Admission Testing.        If you were given a blood bank ID arm band remember to bring it with you the day of surgery.    Preventing a Surgical Site Infection:  • For 2 to 3 days before surgery, avoid shaving with a razor because the razor can irritate skin and make it easier to develop an infection.    • Any areas of open skin can increase the risk of a post-operative wound infection by allowing bacteria to enter and travel throughout the body.  Notify your surgeon if you have any skin wounds / rashes even if it is not near the expected surgical site.  The area will need assessed to determine if surgery should be delayed until it is healed.  • The night prior to surgery shower using a fresh bar of anti-bacterial soap (such as Dial) and clean washcloth.  Sleep in a clean bed with clean clothing.  Do not allow pets to sleep with you.  • Shower on the morning of surgery using a fresh bar of anti-bacterial soap (such as Dial) and clean washcloth.  Dry with a clean towel and dress in clean clothing.  • Ask your surgeon if you will be receiving antibiotics prior to surgery.  • Make sure you, your family, and all healthcare providers clean their hands with soap and water or an alcohol based hand  before caring for you or your wound.    Day of surgery: 7/10/2020 PT TO BE NOTIFIED OF ARRIVAL TIME  Your arrival time is approximately two hours before your scheduled surgery time.  Upon arrival, a Pre-op nurse and Anesthesiologist will review your health history, obtain vital signs, and answer questions you may have.  The only belongings needed at this time will be a list of your home medications and if applicable your C-PAP/BI-PAP machine.  If you are staying overnight your family can leave the rest of your belongings in the car and bring them to your room later.  A Pre-op nurse will start an IV and you may receive medication in preparation for surgery, including something to help you relax.  Your family will be  able to see you in the Pre-op area.  Two visitors at a time will be allowed in the Pre-op room.  While you are in surgery your family should notify the waiting room  if they leave the waiting room area and provide a contact phone number.    Please be aware that surgery does come with discomfort.  We want to make every effort to control your discomfort so please discuss any uncontrolled symptoms with your nurse.   Your doctor will most likely have prescribed pain medications.      If you are going home after surgery you will receive individualized written care instructions before being discharged.  A responsible adult must drive you to and from the hospital on the day of your surgery and stay with you for 24 hours.    If you are staying overnight following surgery, you will be transported to your hospital room following the recovery period.  [facility] has all private rooms.    If you have any questions please call Pre-Admission Testing at [phone].  Deductibles and co-payments are collected on the day of service. Please be prepared to pay the required co-pay, deductible or deposit on the day of service as defined by your plan.    Patient Education for Self-Quarantine Process    Following your COVID testing, we strongly recommend that you do not leave your home after you have been tested for COVID except to get medical care. This includes not going to work, school or to public areas.  If this is not possible for you to do please limit your activities to only required outings.  Be sure to wear a mask when you are with other people, practice social distancing and wash your hands frequently.      The following items provide additional details to keep you safe.  • Wash your hands with soap and water frequently for at least 20 seconds.   • Avoid touching your eyes, nose and mouth with unwashed hands.  • Do not share anything - utensils, towels, food from the same bowl.   • Have your own  utensils, drinking glass, dishes, towels and bedding.   • Do not have visitors.   • Do use FaceTime to stay in touch with family and friends.  • You should stay in a specific room away from others if possible.   • Stay at least 6 feet away from others in the home if you cannot have a dedicated room to yourself.   • Do not snuggle with your pet. While the CDC says there is no evidence that pets can spread COVID-19 or be infected from humans, it is probably best to avoid “petting, snuggling, being kissed or licked and sharing food (during self-quarantine)”, according to the CDC.   • Sanitize household surfaces daily. Include all high touch areas (door handles, light switches, phones, countertops, etc.)  • Do not share a bathroom with others, if possible.   • Wear a mask around others in your home if you are unable to stay in a separate room or 6 feet apart. If  you are unable to wear a mask, have your family member wear a mask if they must be within 6 feet of you.   Call your surgeon immediately if you experience any of the following symptoms:  • Sore Throat  • Shortness of Breath or difficulty breathing  • Cough  • Chills  • Body soreness or muscle pain  • Headache  • Fever  • New loss of taste or smell  • Do not arrive for your surgery ill.  Your procedure will need to be rescheduled to another time.  You will need to call your physician before the day of surgery to avoid any unnecessary exposure to hospital staff as well as other patients.    CHLORHEXIDINE CLOTH INSTRUCTIONS  The morning of surgery follow these instructions using the Chlorhexidine cloths you've been given.  These steps reduce bacteria on the body.  Do not use the cloths near your eyes, ears mouth, genitalia or on open wounds.  Throw the cloths away after use but do not try to flush them down a toilet.      • Open and remove one cloth at a time from the package.    • Leave the cloth unfolded and begin the bathing.  • Massage the skin with the cloths  using gentle pressure to remove bacteria.  Do not scrub harshly.   • Follow the steps below with one 2% CHG cloth per area (6 total cloths).  • One cloth for neck, shoulders and chest.  • One cloth for both arms, hands, fingers and underarms (do underarms last).  • One cloth for the abdomen followed by groin.  • One cloth for right leg and foot including between the toes.  • One cloth for left leg and foot including between the toes.  • The last cloth is to be used for the back of the neck, back and buttocks.    Allow the CHG to air dry 3 minutes on the skin which will give it time to work and decrease the chance of irritation.  The skin may feel sticky until it is dry.  Do not rinse with water or any other liquid or you will lose the beneficial effects of the CHG.  If mild skin irritation occurs, do rinse the skin to remove the CHG.  Report this to the nurse at time of admission.  Do not apply lotions, creams, ointments, deodorants or perfumes after using the clothes. Dress in clean clothes before coming to the hospital.    BACTROBAN NASAL OINTMENT  There are many germs normally in your nose. Bactroban is an ointment that will help reduce these germs. Please follow these instructions for Bactroban use:      ____The day before surgery in the morning  Date________    ____The day before surgery in the evening              Date________    ____The day of surgery in the morning    Date________    **Squirt ½ package of Bactroban Ointment onto a cotton applicator and apply to inside of 1st nostril.  Squirt the remaining Bactroban and apply to the inside of the other nostril.

## 2020-07-09 LAB
REF LAB TEST METHOD: NORMAL
SARS-COV-2 RNA RESP QL NAA+PROBE: NOT DETECTED

## 2020-07-10 ENCOUNTER — APPOINTMENT (OUTPATIENT)
Dept: GENERAL RADIOLOGY | Facility: HOSPITAL | Age: 70
End: 2020-07-10

## 2020-07-10 ENCOUNTER — ANESTHESIA EVENT (OUTPATIENT)
Dept: PERIOP | Facility: HOSPITAL | Age: 70
End: 2020-07-10

## 2020-07-10 ENCOUNTER — HOSPITAL ENCOUNTER (OUTPATIENT)
Facility: HOSPITAL | Age: 70
Discharge: HOME HEALTH | End: 2020-07-11
Attending: ORTHOPAEDIC SURGERY | Admitting: ORTHOPAEDIC SURGERY

## 2020-07-10 ENCOUNTER — ANESTHESIA (OUTPATIENT)
Dept: PERIOP | Facility: HOSPITAL | Age: 70
End: 2020-07-10

## 2020-07-10 DIAGNOSIS — M17.11 PRIMARY OSTEOARTHRITIS OF RIGHT KNEE: ICD-10-CM

## 2020-07-10 PROCEDURE — 25010000002 ONDANSETRON PER 1 MG: Performed by: REGISTERED NURSE

## 2020-07-10 PROCEDURE — 25010000003 CEFAZOLIN IN DEXTROSE 2-4 GM/100ML-% SOLUTION: Performed by: ORTHOPAEDIC SURGERY

## 2020-07-10 PROCEDURE — 73560 X-RAY EXAM OF KNEE 1 OR 2: CPT

## 2020-07-10 PROCEDURE — C9290 INJ, BUPIVACAINE LIPOSOME: HCPCS | Performed by: ORTHOPAEDIC SURGERY

## 2020-07-10 PROCEDURE — 25010000002 DEXAMETHASONE PER 1 MG: Performed by: REGISTERED NURSE

## 2020-07-10 PROCEDURE — 27447 TOTAL KNEE ARTHROPLASTY: CPT | Performed by: ORTHOPAEDIC SURGERY

## 2020-07-10 PROCEDURE — 25010000002 VANCOMYCIN 10 G RECONSTITUTED SOLUTION: Performed by: ORTHOPAEDIC SURGERY

## 2020-07-10 PROCEDURE — 25010000003 BUPIVACAINE LIPOSOME 1.3 % SUSPENSION 20 ML VIAL: Performed by: ORTHOPAEDIC SURGERY

## 2020-07-10 PROCEDURE — C1713 ANCHOR/SCREW BN/BN,TIS/BN: HCPCS | Performed by: ORTHOPAEDIC SURGERY

## 2020-07-10 PROCEDURE — C1776 JOINT DEVICE (IMPLANTABLE): HCPCS | Performed by: ORTHOPAEDIC SURGERY

## 2020-07-10 PROCEDURE — 25010000002 PROPOFOL 10 MG/ML EMULSION: Performed by: REGISTERED NURSE

## 2020-07-10 PROCEDURE — 25010000002 MIDAZOLAM PER 1 MG: Performed by: ANESTHESIOLOGY

## 2020-07-10 DEVICE — DEV CONTRL TISS STRATAFIX SPIRAL MNCRYL UD 3/0 PLS 30CM: Type: IMPLANTABLE DEVICE | Site: KNEE | Status: FUNCTIONAL

## 2020-07-10 DEVICE — LEGION CRUCIATE RETAINING OXINIUM                                    FEMORAL SIZE 6 RIGHT
Type: IMPLANTABLE DEVICE | Status: FUNCTIONAL
Brand: LEGION

## 2020-07-10 DEVICE — DEV CONTRL TISS STRATAFIX SYMM PDS PLUS VIL CT-1 60CM: Type: IMPLANTABLE DEVICE | Site: KNEE | Status: FUNCTIONAL

## 2020-07-10 DEVICE — PALACOS® R IS A FAST-CURING, RADIOPAQUE, POLY(METHYL METHACRYLATE)-BASED BONE CEMENT.PALACOS ® R CONTAINS THE X-RAY CONTRAST MEDIUM ZIRCONIUM DIOXIDE. TO IMPROVE VISIBILITY IN THE SURGICAL FIELD PALACOS ® R HAS BEEN COLOURED WITH CHLOROPHYLL (E141). THE BONE CEMENT IS PREPARED DIRECTLY BEFORE USE BY MIXING A POLYMER POWDER COMPONENT WITH A LIQUID MONOMER COMPONENT. A DUCTILE DOUGH FORMS WHICH CURES WITHIN A FEW MINUTES.
Type: IMPLANTABLE DEVICE | Site: KNEE | Status: FUNCTIONAL
Brand: PALACOS®

## 2020-07-10 DEVICE — GENESIS II BICONVEX PATELLA 29MM
Type: IMPLANTABLE DEVICE | Site: KNEE | Status: FUNCTIONAL
Brand: GENESIS II

## 2020-07-10 DEVICE — GENESIS II NON-POROUS TIBIAL                                    BASEPLATE SIZE 7 RIGHT
Type: IMPLANTABLE DEVICE | Site: KNEE | Status: FUNCTIONAL
Brand: GENESIS II

## 2020-07-10 DEVICE — LEGION CRUCIATE RETAINING HIGH                                    FLEX HIGHLY CROSS LINKED                                    POLYETHYLENE SIZE 7-8 11MM
Type: IMPLANTABLE DEVICE | Site: KNEE | Status: FUNCTIONAL
Brand: LEGION

## 2020-07-10 DEVICE — IMPLANTABLE DEVICE: Type: IMPLANTABLE DEVICE | Status: FUNCTIONAL

## 2020-07-10 RX ORDER — PROPOFOL 10 MG/ML
VIAL (ML) INTRAVENOUS CONTINUOUS PRN
Status: DISCONTINUED | OUTPATIENT
Start: 2020-07-10 | End: 2020-07-10 | Stop reason: SURG

## 2020-07-10 RX ORDER — NALOXONE HCL 0.4 MG/ML
0.2 VIAL (ML) INJECTION AS NEEDED
Status: DISCONTINUED | OUTPATIENT
Start: 2020-07-10 | End: 2020-07-10

## 2020-07-10 RX ORDER — HYDROMORPHONE HYDROCHLORIDE 1 MG/ML
0.5 INJECTION, SOLUTION INTRAMUSCULAR; INTRAVENOUS; SUBCUTANEOUS
Status: DISCONTINUED | OUTPATIENT
Start: 2020-07-10 | End: 2020-07-10

## 2020-07-10 RX ORDER — MAGNESIUM HYDROXIDE 1200 MG/15ML
LIQUID ORAL AS NEEDED
Status: DISCONTINUED | OUTPATIENT
Start: 2020-07-10 | End: 2020-07-10 | Stop reason: HOSPADM

## 2020-07-10 RX ORDER — SODIUM CHLORIDE 0.9 % (FLUSH) 0.9 %
3 SYRINGE (ML) INJECTION EVERY 12 HOURS SCHEDULED
Status: DISCONTINUED | OUTPATIENT
Start: 2020-07-10 | End: 2020-07-10 | Stop reason: HOSPADM

## 2020-07-10 RX ORDER — CEFAZOLIN SODIUM 2 G/100ML
2 INJECTION, SOLUTION INTRAVENOUS EVERY 8 HOURS
Status: COMPLETED | OUTPATIENT
Start: 2020-07-10 | End: 2020-07-11

## 2020-07-10 RX ORDER — OXYCODONE AND ACETAMINOPHEN 7.5; 325 MG/1; MG/1
1 TABLET ORAL ONCE AS NEEDED
Status: DISCONTINUED | OUTPATIENT
Start: 2020-07-10 | End: 2020-07-10

## 2020-07-10 RX ORDER — HYDRALAZINE HYDROCHLORIDE 20 MG/ML
5 INJECTION INTRAMUSCULAR; INTRAVENOUS
Status: DISCONTINUED | OUTPATIENT
Start: 2020-07-10 | End: 2020-07-10

## 2020-07-10 RX ORDER — MELOXICAM 15 MG/1
15 TABLET ORAL ONCE
Status: COMPLETED | OUTPATIENT
Start: 2020-07-10 | End: 2020-07-10

## 2020-07-10 RX ORDER — EPHEDRINE SULFATE 50 MG/ML
5 INJECTION, SOLUTION INTRAVENOUS ONCE AS NEEDED
Status: DISCONTINUED | OUTPATIENT
Start: 2020-07-10 | End: 2020-07-10

## 2020-07-10 RX ORDER — UREA 10 %
3 LOTION (ML) TOPICAL NIGHTLY PRN
Status: DISCONTINUED | OUTPATIENT
Start: 2020-07-10 | End: 2020-07-11 | Stop reason: HOSPADM

## 2020-07-10 RX ORDER — LIDOCAINE HYDROCHLORIDE 20 MG/ML
INJECTION, SOLUTION INFILTRATION; PERINEURAL AS NEEDED
Status: DISCONTINUED | OUTPATIENT
Start: 2020-07-10 | End: 2020-07-10 | Stop reason: SURG

## 2020-07-10 RX ORDER — ONDANSETRON 4 MG/1
4 TABLET, FILM COATED ORAL EVERY 6 HOURS PRN
Status: DISCONTINUED | OUTPATIENT
Start: 2020-07-10 | End: 2020-07-11 | Stop reason: HOSPADM

## 2020-07-10 RX ORDER — BUPIVACAINE HYDROCHLORIDE 7.5 MG/ML
INJECTION, SOLUTION EPIDURAL; RETROBULBAR
Status: COMPLETED | OUTPATIENT
Start: 2020-07-10 | End: 2020-07-10

## 2020-07-10 RX ORDER — TAMSULOSIN HYDROCHLORIDE 0.4 MG/1
0.4 CAPSULE ORAL DAILY
Status: DISCONTINUED | OUTPATIENT
Start: 2020-07-10 | End: 2020-07-11 | Stop reason: HOSPADM

## 2020-07-10 RX ORDER — MIDAZOLAM HYDROCHLORIDE 1 MG/ML
1 INJECTION INTRAMUSCULAR; INTRAVENOUS
Status: DISCONTINUED | OUTPATIENT
Start: 2020-07-10 | End: 2020-07-10 | Stop reason: HOSPADM

## 2020-07-10 RX ORDER — PROMETHAZINE HYDROCHLORIDE 25 MG/ML
12.5 INJECTION, SOLUTION INTRAMUSCULAR; INTRAVENOUS ONCE AS NEEDED
Status: DISCONTINUED | OUTPATIENT
Start: 2020-07-10 | End: 2020-07-10

## 2020-07-10 RX ORDER — CEFAZOLIN SODIUM 2 G/100ML
2 INJECTION, SOLUTION INTRAVENOUS ONCE
Status: COMPLETED | OUTPATIENT
Start: 2020-07-10 | End: 2020-07-10

## 2020-07-10 RX ORDER — SODIUM CHLORIDE 0.9 % (FLUSH) 0.9 %
3-10 SYRINGE (ML) INJECTION AS NEEDED
Status: DISCONTINUED | OUTPATIENT
Start: 2020-07-10 | End: 2020-07-10 | Stop reason: HOSPADM

## 2020-07-10 RX ORDER — ACETAMINOPHEN 325 MG/1
650 TABLET ORAL ONCE AS NEEDED
Status: DISCONTINUED | OUTPATIENT
Start: 2020-07-10 | End: 2020-07-10

## 2020-07-10 RX ORDER — DIPHENHYDRAMINE HYDROCHLORIDE 50 MG/ML
12.5 INJECTION INTRAMUSCULAR; INTRAVENOUS
Status: DISCONTINUED | OUTPATIENT
Start: 2020-07-10 | End: 2020-07-10

## 2020-07-10 RX ORDER — HYDROCODONE BITARTRATE AND ACETAMINOPHEN 7.5; 325 MG/1; MG/1
1 TABLET ORAL ONCE AS NEEDED
Status: DISCONTINUED | OUTPATIENT
Start: 2020-07-10 | End: 2020-07-10

## 2020-07-10 RX ORDER — PROMETHAZINE HYDROCHLORIDE 25 MG/ML
6.25 INJECTION, SOLUTION INTRAMUSCULAR; INTRAVENOUS
Status: DISCONTINUED | OUTPATIENT
Start: 2020-07-10 | End: 2020-07-10

## 2020-07-10 RX ORDER — PANTOPRAZOLE SODIUM 40 MG/1
40 TABLET, DELAYED RELEASE ORAL DAILY
Status: DISCONTINUED | OUTPATIENT
Start: 2020-07-10 | End: 2020-07-11 | Stop reason: HOSPADM

## 2020-07-10 RX ORDER — PREGABALIN 75 MG/1
150 CAPSULE ORAL ONCE
Status: COMPLETED | OUTPATIENT
Start: 2020-07-10 | End: 2020-07-10

## 2020-07-10 RX ORDER — LABETALOL HYDROCHLORIDE 5 MG/ML
5 INJECTION, SOLUTION INTRAVENOUS
Status: DISCONTINUED | OUTPATIENT
Start: 2020-07-10 | End: 2020-07-10

## 2020-07-10 RX ORDER — SODIUM CHLORIDE, SODIUM LACTATE, POTASSIUM CHLORIDE, CALCIUM CHLORIDE 600; 310; 30; 20 MG/100ML; MG/100ML; MG/100ML; MG/100ML
9 INJECTION, SOLUTION INTRAVENOUS CONTINUOUS
Status: DISCONTINUED | OUTPATIENT
Start: 2020-07-10 | End: 2020-07-10 | Stop reason: HOSPADM

## 2020-07-10 RX ORDER — LIDOCAINE HYDROCHLORIDE 10 MG/ML
0.5 INJECTION, SOLUTION EPIDURAL; INFILTRATION; INTRACAUDAL; PERINEURAL ONCE AS NEEDED
Status: DISCONTINUED | OUTPATIENT
Start: 2020-07-10 | End: 2020-07-10 | Stop reason: HOSPADM

## 2020-07-10 RX ORDER — TAMSULOSIN HYDROCHLORIDE 0.4 MG/1
0.4 CAPSULE ORAL DAILY
Status: DISCONTINUED | OUTPATIENT
Start: 2020-07-10 | End: 2020-07-10

## 2020-07-10 RX ORDER — WOUND DRESSING ADHESIVE - LIQUID
LIQUID MISCELLANEOUS AS NEEDED
Status: DISCONTINUED | OUTPATIENT
Start: 2020-07-10 | End: 2020-07-10 | Stop reason: HOSPADM

## 2020-07-10 RX ORDER — PROMETHAZINE HYDROCHLORIDE 25 MG/1
25 SUPPOSITORY RECTAL ONCE AS NEEDED
Status: DISCONTINUED | OUTPATIENT
Start: 2020-07-10 | End: 2020-07-10

## 2020-07-10 RX ORDER — FAMOTIDINE 10 MG/ML
20 INJECTION, SOLUTION INTRAVENOUS ONCE
Status: COMPLETED | OUTPATIENT
Start: 2020-07-10 | End: 2020-07-10

## 2020-07-10 RX ORDER — GLYCOPYRROLATE 0.2 MG/ML
INJECTION INTRAMUSCULAR; INTRAVENOUS AS NEEDED
Status: DISCONTINUED | OUTPATIENT
Start: 2020-07-10 | End: 2020-07-10 | Stop reason: SURG

## 2020-07-10 RX ORDER — TRANEXAMIC ACID 100 MG/ML
INJECTION, SOLUTION INTRAVENOUS AS NEEDED
Status: DISCONTINUED | OUTPATIENT
Start: 2020-07-10 | End: 2020-07-10 | Stop reason: SURG

## 2020-07-10 RX ORDER — ONDANSETRON 2 MG/ML
4 INJECTION INTRAMUSCULAR; INTRAVENOUS EVERY 6 HOURS PRN
Status: DISCONTINUED | OUTPATIENT
Start: 2020-07-10 | End: 2020-07-11 | Stop reason: HOSPADM

## 2020-07-10 RX ORDER — PROMETHAZINE HYDROCHLORIDE 25 MG/1
25 TABLET ORAL ONCE AS NEEDED
Status: DISCONTINUED | OUTPATIENT
Start: 2020-07-10 | End: 2020-07-10

## 2020-07-10 RX ORDER — HYDROCODONE BITARTRATE AND ACETAMINOPHEN 7.5; 325 MG/1; MG/1
1 TABLET ORAL EVERY 4 HOURS PRN
Status: DISCONTINUED | OUTPATIENT
Start: 2020-07-10 | End: 2020-07-11 | Stop reason: HOSPADM

## 2020-07-10 RX ORDER — DIPHENHYDRAMINE HCL 25 MG
25 CAPSULE ORAL
Status: DISCONTINUED | OUTPATIENT
Start: 2020-07-10 | End: 2020-07-10

## 2020-07-10 RX ORDER — HYDROCODONE BITARTRATE AND ACETAMINOPHEN 7.5; 325 MG/1; MG/1
2 TABLET ORAL EVERY 4 HOURS PRN
Status: DISCONTINUED | OUTPATIENT
Start: 2020-07-10 | End: 2020-07-11 | Stop reason: HOSPADM

## 2020-07-10 RX ORDER — FLUMAZENIL 0.1 MG/ML
0.2 INJECTION INTRAVENOUS AS NEEDED
Status: DISCONTINUED | OUTPATIENT
Start: 2020-07-10 | End: 2020-07-10

## 2020-07-10 RX ORDER — MELOXICAM 15 MG/1
15 TABLET ORAL DAILY
Status: DISCONTINUED | OUTPATIENT
Start: 2020-07-11 | End: 2020-07-11 | Stop reason: HOSPADM

## 2020-07-10 RX ORDER — ACETAMINOPHEN 10 MG/ML
1000 INJECTION, SOLUTION INTRAVENOUS ONCE
Status: COMPLETED | OUTPATIENT
Start: 2020-07-10 | End: 2020-07-10

## 2020-07-10 RX ORDER — ONDANSETRON 2 MG/ML
4 INJECTION INTRAMUSCULAR; INTRAVENOUS ONCE AS NEEDED
Status: DISCONTINUED | OUTPATIENT
Start: 2020-07-10 | End: 2020-07-10

## 2020-07-10 RX ORDER — FENTANYL CITRATE 50 UG/ML
50 INJECTION, SOLUTION INTRAMUSCULAR; INTRAVENOUS
Status: DISCONTINUED | OUTPATIENT
Start: 2020-07-10 | End: 2020-07-10 | Stop reason: HOSPADM

## 2020-07-10 RX ORDER — ASPIRIN 81 MG/1
81 TABLET ORAL EVERY 12 HOURS SCHEDULED
Status: DISCONTINUED | OUTPATIENT
Start: 2020-07-11 | End: 2020-07-11 | Stop reason: HOSPADM

## 2020-07-10 RX ORDER — FENTANYL CITRATE 50 UG/ML
50 INJECTION, SOLUTION INTRAMUSCULAR; INTRAVENOUS
Status: DISCONTINUED | OUTPATIENT
Start: 2020-07-10 | End: 2020-07-10

## 2020-07-10 RX ORDER — ONDANSETRON 2 MG/ML
INJECTION INTRAMUSCULAR; INTRAVENOUS AS NEEDED
Status: DISCONTINUED | OUTPATIENT
Start: 2020-07-10 | End: 2020-07-10 | Stop reason: SURG

## 2020-07-10 RX ORDER — DEXAMETHASONE SODIUM PHOSPHATE 4 MG/ML
INJECTION, SOLUTION INTRA-ARTICULAR; INTRALESIONAL; INTRAMUSCULAR; INTRAVENOUS; SOFT TISSUE AS NEEDED
Status: DISCONTINUED | OUTPATIENT
Start: 2020-07-10 | End: 2020-07-10 | Stop reason: SURG

## 2020-07-10 RX ORDER — KETOROLAC TROMETHAMINE 30 MG/ML
30 INJECTION, SOLUTION INTRAMUSCULAR; INTRAVENOUS EVERY 8 HOURS
Status: DISCONTINUED | OUTPATIENT
Start: 2020-07-10 | End: 2020-07-11 | Stop reason: HOSPADM

## 2020-07-10 RX ADMIN — CEFAZOLIN SODIUM 2 G: 2 INJECTION, SOLUTION INTRAVENOUS at 20:06

## 2020-07-10 RX ADMIN — FAMOTIDINE 20 MG: 10 INJECTION INTRAVENOUS at 11:46

## 2020-07-10 RX ADMIN — GLYCOPYRROLATE 0.2 MG: 0.2 INJECTION INTRAMUSCULAR; INTRAVENOUS at 13:10

## 2020-07-10 RX ADMIN — CEFAZOLIN SODIUM 2 G: 2 INJECTION, SOLUTION INTRAVENOUS at 12:51

## 2020-07-10 RX ADMIN — LIDOCAINE HYDROCHLORIDE 100 MG: 20 INJECTION, SOLUTION INFILTRATION; PERINEURAL at 13:03

## 2020-07-10 RX ADMIN — TRANEXAMIC ACID 1000 MG: 100 INJECTION, SOLUTION INTRAVENOUS at 13:02

## 2020-07-10 RX ADMIN — BUPIVACAINE HYDROCHLORIDE 1.8 ML: 7.5 INJECTION, SOLUTION EPIDURAL; RETROBULBAR at 12:59

## 2020-07-10 RX ADMIN — MUPIROCIN 1 APPLICATION: 20 OINTMENT TOPICAL at 20:06

## 2020-07-10 RX ADMIN — ONDANSETRON HYDROCHLORIDE 4 MG: 2 SOLUTION INTRAMUSCULAR; INTRAVENOUS at 14:25

## 2020-07-10 RX ADMIN — SODIUM CHLORIDE, POTASSIUM CHLORIDE, SODIUM LACTATE AND CALCIUM CHLORIDE 9 ML/HR: 600; 310; 30; 20 INJECTION, SOLUTION INTRAVENOUS at 11:47

## 2020-07-10 RX ADMIN — MIDAZOLAM 1 MG: 1 INJECTION INTRAMUSCULAR; INTRAVENOUS at 11:47

## 2020-07-10 RX ADMIN — PREGABALIN 150 MG: 75 CAPSULE ORAL at 10:46

## 2020-07-10 RX ADMIN — ACETAMINOPHEN 1000 MG: 10 INJECTION, SOLUTION INTRAVENOUS at 13:00

## 2020-07-10 RX ADMIN — DEXAMETHASONE SODIUM PHOSPHATE 8 MG: 4 INJECTION INTRA-ARTICULAR; INTRALESIONAL; INTRAMUSCULAR; INTRAVENOUS; SOFT TISSUE at 13:00

## 2020-07-10 RX ADMIN — MELOXICAM 15 MG: 15 TABLET ORAL at 10:46

## 2020-07-10 RX ADMIN — PROPOFOL 100 MCG/KG/MIN: 10 INJECTION, EMULSION INTRAVENOUS at 13:03

## 2020-07-10 RX ADMIN — TAMSULOSIN HYDROCHLORIDE 0.4 MG: 0.4 CAPSULE ORAL at 20:06

## 2020-07-10 RX ADMIN — VANCOMYCIN HYDROCHLORIDE 1500 MG: 10 INJECTION, POWDER, LYOPHILIZED, FOR SOLUTION INTRAVENOUS at 10:46

## 2020-07-10 NOTE — PLAN OF CARE
See below.    Problem: Patient Care Overview  Goal: Plan of Care Review  Outcome: Ongoing (interventions implemented as appropriate)  Flowsheets  Taken 7/10/2020 1807  Progress: improving  Outcome Summary: 70/M POD#0 right TKA.  ALOx4, RA, lungs clear, BS hypoactive, voiding independently.  Up x1-2 BRP with walker/gait belt.  2+ pedal pulses noted bilaterally, numbness/tingling noted in BLE r/t spinal block, dressing CDI.  Pain well-controlled, no need for PRN PO pain meds at this time.  PIV infusing LR @ KVO per MD orders with intermittent ABX infusions.  D/C planning in progress, plans to D/C home with HH tomorrow when medically ready.  Taken 7/10/2020 5540  Plan of Care Reviewed With: patient;spouse

## 2020-07-10 NOTE — ANESTHESIA PROCEDURE NOTES
Spinal Block      Patient location during procedure: OR  Stop Time: 7/10/2020 12:59 PM  Indication:at surgeon's request  Performed By  Anesthesiologist: Elsa Pedro MD  Preanesthetic Checklist  Completed: patient identified, site marked, surgical consent, pre-op evaluation, timeout performed, IV checked, risks and benefits discussed and monitors and equipment checked  Spinal Block Prep:  Patient Position:sitting  Sterile Tech:cap, gloves and sterile barriers  Prep:Chloraprep  Patient Monitoring:EKG, continuous pulse oximetry and blood pressure monitoring  Spinal Block Procedure  Approach:right paramedian  Location:L3-L4  Needle Type:Sprotte  Needle Gauge:24 G  Placement of Spinal needle event:cerebrospinal fluid aspirated  Paresthesia: no  Fluid Appearance:clear  Medications: bupivacaine PF (MARCAINE) 0.75 % injection, 1.8 mL   Post Assessment  Patient Tolerance:patient tolerated the procedure well with no apparent complications  Complications no  Additional Notes  Two other attempts one at L3-4 and one at L2-3 at the midline without success.  Then one attempt at right paramedian with success.

## 2020-07-10 NOTE — ANESTHESIA POSTPROCEDURE EVALUATION
"Patient: Aldair Cordova    Procedure Summary     Date:  07/10/20 Room / Location:  [facility] OR [address] [place] [address] MAIN OR    Anesthesia Start:  1242 Anesthesia Stop:  1514    Procedure:  TOTAL KNEE ARTHROPLASTY (Right Knee) Diagnosis:       Primary osteoarthritis of right knee      (Primary osteoarthritis of right knee [M17.11])    Surgeon:  Rehan Fernández MD Provider:  Elsa Pedro MD    Anesthesia Type:  spinal ASA Status:  3          Anesthesia Type: spinal    Vitals  Vitals Value Taken Time   /84 7/10/2020  3:35 PM   Temp 36.6 °C (97.9 °F) 7/10/2020  3:11 PM   Pulse 57 7/10/2020  3:51 PM   Resp 18 7/10/2020  3:20 PM   SpO2 100 % 7/10/2020  3:51 PM   Vitals shown include unvalidated device data.        Post Anesthesia Care and Evaluation    Patient location during evaluation: bedside  Pain management: adequate  Airway patency: patent  Anesthetic complications: No anesthetic complications    Cardiovascular status: acceptable  Respiratory status: acceptable  Hydration status: acceptable    Comments: /79   Pulse (!) 49   Temp 36.6 °C (97.9 °F) (Oral)   Resp 18   Ht 182.9 cm (72\")   Wt 109 kg (241 lb)   SpO2 100%   BMI 32.69 kg/m²         "

## 2020-07-10 NOTE — ANESTHESIA PREPROCEDURE EVALUATION
Anesthesia Evaluation     Patient summary reviewed and Nursing notes reviewed                Airway   Mallampati: II  TM distance: >3 FB  Neck ROM: full  No difficulty expected  Dental - normal exam     Pulmonary - normal exam   (+) sleep apnea on CPAP,   Cardiovascular - normal exam    (+) hypertension, hyperlipidemia,       Neuro/Psych  (+) psychiatric history Anxiety,     GI/Hepatic/Renal/Endo    (+) obesity,  GERD,  renal disease stones,     Musculoskeletal     Abdominal   (+) obese,    Substance History      OB/GYN          Other   arthritis, blood dyscrasia thrombocytopenia,       Other Comment: Plts 136K on 7/8                  Anesthesia Plan    ASA 3     spinal   (Pt has hx of thrombocytopenia but platelet count was 136K 2 days ago so SAB would not be any excess risk, pt and wife  in agreement to proceed with spinal)  intravenous induction     Anesthetic plan, all risks, benefits, and alternatives have been provided, discussed and informed consent has been obtained with: patient.

## 2020-07-10 NOTE — OP NOTE
Name: Aldair Cordova  YOB: 1950    DATE OF SURGERY: 7/10/2020    PREOPERATIVE DIAGNOSIS: Right knee end-stage osteoarthritis    POSTOPERATIVE DIAGNOSIS: Right knee end-stage osteoarthritis    PROCEDURE PERFORMED: Right total knee replacement    SURGEON: Rehan Fernández M.D.    ASSISTANT: NIK GTZ    IMPLANTS: [employer]:     Implant Name Type Inv. Item Serial No.  Lot No. LRB No. Used   CMT BONE PALACOS R HI/VISC 1X40 - [id number] Implant CMT BONE PALACOS R HI/VISC 1X40  [facility] [id number] Right 2   SUT CONTRL TISS STRATAFIX SPIRAL MNCRYL UD 3/0 PLS 30CM - [id number] Implant SUT CONTRL TISS STRATAFIX SPIRAL MNCRYL UD 3/0 PLS 30CM  [employer]  [employer] [id number] Right 1   SUT CONTRL TISS STRATAFIX SYMM PDS PLUS BRANDO CT-1 60CM - [id number] Implant SUT CONTRL TISS STRATAFIX SYMM PDS PLUS BRANDO CT-1 60CM  [employer]  DIV [employer] [id number] Right 1   COMP FEM LEGION OXINIUM CR SZ6 RT - [id number] Implant COMP FEM LEGION OXINIUM CR SZ6 RT  [employer] [id number] Right 1   BASE TIB/KN GEN2 NONPOR TI SZ7 RT - [id number] Implant BASE TIB/KN GEN2 NONPOR TI SZ7 RT  [employer] [id number] Right 1   PAT GEN2 BICONVEX [id number] - [id number] Implant PAT GEN2 BICONVEX [id number]  [employer] [id number] Right 1   INSRT ART LEGION CR HF XLPE SZ7TO8 11MM - [id number] Implant INSRT ART LEGION CR HF XLPE SZ7TO8 11MM  [employer] [id number] Right 1       Estimated Blood Loss: 200cc  Specimens : none  Complications: none    DESCRIPTION OF PROCEDURE: The patient was taken to the operating room and placed in the supine position. A sequential compression device was carefully placed on the non-operative leg. Preoperative antibiotics were administered. Surgical time out was performed. After adequate induction of anesthesia, the leg was prepped and draped in the usual sterile fashion, exsanguinated with an Esmarch bandage and the tourniquet inflated to 250 mmHg. A midline  incision was performed followed by a medial parapatellar arthrotomy. The patella was subluxed laterally.  A portion of the fat pad, ACL, and anterior horns of the meniscus were excised. The drill hole was placed in the distal femur and the canal was the irrigated and suctioned. The IM manav was placed and a 5 degree distal valgus cut was performed on the femur. The femur was then sized with a sizing guide. The femoral cutting block was placed and all femoral cuts were performed. The proximal tibia was exposed. We used the extramedullary tibial cutting guide set for removal of 9mm of bone off the high side. The tibial cut was performed. The posterior horns of the menisci were excised. The posterior osteophytes were removed. Flexion extension blocks were then used to balance the knee. The tibial cut surface was then sized with the sizing templates and the tibial and femoral trial were then placed. The knee was placed in full extension and then the tibial tray rotation was then matched to the femoral rotation and marked.    Attention was then placed to the patella. The patella was noted to track centrally through range of motion. The patella was then sized with the trials. The thickness of the patella was then measured. The patella was resurfaced and the surrounding osteophytes were removed. The preoperative thickness was reproduced. The patella tracked centrally through range of motion.   At this point all trial components were removed, the knee was copiously irrigated with pulsed lavage, and the knee was injected with anesthetic cocktail solution. The cut surfaces were then dried with clean lap sponges, and the components were cemented tibia, followed by femur, then patella. The knee was held in full extension and all excess cement was removed. The knee was held still until the cement had completely hardened. We then placed the trial polyethylene spacer which resulted in full extension and excellent flexion-extension  balance. We placed the final polyethylene spacer.   The knee was then copiously irrigated. The tourniquet was then released. There was excellent hemostasis. We placed a one-eighth inch Hemovac drain. We closed the knee in multiple layers in standard fashion. Sterile dressing were applied. At the end of the case, the sponge and needle counts were reported as being correct. There were no known complications. The patient was then transported to the recovery room.      Rehan Fernández M.D.

## 2020-07-11 VITALS
TEMPERATURE: 98.4 F | HEART RATE: 73 BPM | DIASTOLIC BLOOD PRESSURE: 78 MMHG | HEIGHT: 72 IN | SYSTOLIC BLOOD PRESSURE: 134 MMHG | OXYGEN SATURATION: 96 % | RESPIRATION RATE: 16 BRPM | BODY MASS INDEX: 32.55 KG/M2 | WEIGHT: 240.3 LBS

## 2020-07-11 LAB
CREAT SERPL-MCNC: 1.01 MG/DL (ref 0.76–1.27)
GFR SERPL CREATININE-BSD FRML MDRD: 73 ML/MIN/1.73
HCT VFR BLD AUTO: 39.1 % (ref 37.5–51)
HGB BLD-MCNC: 13.8 G/DL (ref 13–17.7)

## 2020-07-11 PROCEDURE — 85014 HEMATOCRIT: CPT | Performed by: ORTHOPAEDIC SURGERY

## 2020-07-11 PROCEDURE — 97162 PT EVAL MOD COMPLEX 30 MIN: CPT

## 2020-07-11 PROCEDURE — 97116 GAIT TRAINING THERAPY: CPT

## 2020-07-11 PROCEDURE — 25010000002 KETOROLAC TROMETHAMINE PER 15 MG: Performed by: ORTHOPAEDIC SURGERY

## 2020-07-11 PROCEDURE — 97110 THERAPEUTIC EXERCISES: CPT

## 2020-07-11 PROCEDURE — 25010000003 CEFAZOLIN IN DEXTROSE 2-4 GM/100ML-% SOLUTION: Performed by: ORTHOPAEDIC SURGERY

## 2020-07-11 PROCEDURE — 85018 HEMOGLOBIN: CPT | Performed by: ORTHOPAEDIC SURGERY

## 2020-07-11 PROCEDURE — 82565 ASSAY OF CREATININE: CPT | Performed by: ORTHOPAEDIC SURGERY

## 2020-07-11 RX ORDER — HYDROCODONE BITARTRATE AND ACETAMINOPHEN 7.5; 325 MG/1; MG/1
TABLET ORAL
Qty: 60 TABLET | Refills: 0 | Status: SHIPPED | OUTPATIENT
Start: 2020-07-11 | End: 2020-07-24 | Stop reason: SDUPTHER

## 2020-07-11 RX ORDER — ASPIRIN 81 MG/1
81 TABLET ORAL EVERY 12 HOURS SCHEDULED
Qty: 60 TABLET | Refills: 0 | Status: SHIPPED | OUTPATIENT
Start: 2020-07-11 | End: 2020-08-04

## 2020-07-11 RX ORDER — ONDANSETRON 4 MG/1
4 TABLET, FILM COATED ORAL EVERY 6 HOURS PRN
Qty: 10 TABLET | Refills: 0 | Status: SHIPPED | OUTPATIENT
Start: 2020-07-11 | End: 2020-12-11

## 2020-07-11 RX ORDER — POLYETHYLENE GLYCOL 3350 17 G/17G
17 POWDER, FOR SOLUTION ORAL 2 TIMES DAILY
Qty: 14 PACKET | Refills: 0 | Status: SHIPPED | OUTPATIENT
Start: 2020-07-11 | End: 2020-07-18

## 2020-07-11 RX ADMIN — KETOROLAC TROMETHAMINE 30 MG: 30 INJECTION, SOLUTION INTRAMUSCULAR at 01:53

## 2020-07-11 RX ADMIN — HYDROCODONE BITARTRATE AND ACETAMINOPHEN 1 TABLET: 7.5; 325 TABLET ORAL at 09:05

## 2020-07-11 RX ADMIN — POLYETHYLENE GLYCOL 3350 17 G: 17 POWDER, FOR SOLUTION ORAL at 09:02

## 2020-07-11 RX ADMIN — HYDROCODONE BITARTRATE AND ACETAMINOPHEN 1 TABLET: 7.5; 325 TABLET ORAL at 00:32

## 2020-07-11 RX ADMIN — PANTOPRAZOLE SODIUM 40 MG: 40 TABLET, DELAYED RELEASE ORAL at 09:02

## 2020-07-11 RX ADMIN — CEFAZOLIN SODIUM 2 G: 2 INJECTION, SOLUTION INTRAVENOUS at 04:22

## 2020-07-11 RX ADMIN — ASPIRIN 81 MG: 81 TABLET, COATED ORAL at 09:02

## 2020-07-11 RX ADMIN — MUPIROCIN 1 APPLICATION: 20 OINTMENT TOPICAL at 09:02

## 2020-07-11 RX ADMIN — MELOXICAM 15 MG: 15 TABLET ORAL at 09:02

## 2020-07-11 NOTE — PLAN OF CARE
Problem: Patient Care Overview  Goal: Plan of Care Review  Outcome: Ongoing (interventions implemented as appropriate)  Flowsheets (Taken 7/11/2020 1007)  Plan of Care Reviewed With: patient  Outcome Summary: Pt is a 71 yo male s/p Right TKA POD #1. He lives with spouse in a home with 2 steps to enter, independent with mobility at baseline. He currently presents with typical post-op impairments related to TKA surgery including decreased knee ROM and strength. Pt able to ambulate x 150 ft this date with RW and Sup A. Also able to negotiate stairs safely with CGA. Anticipates d/c home today. At this time PT recommends home with A, HHPT, RW, and BSC for discharge.   ..Patient was wearing a face mask during this therapy encounter. Therapist used appropriate personal protective equipment including mask and gloves.  Mask used was standard procedure mask. Appropriate PPE was worn during the entire therapy session. Hand hygiene was completed before and after therapy session. Patient is not in enhanced droplet precautions.

## 2020-07-11 NOTE — PROGRESS NOTES
Discharge Planning Assessment  [facility]     Patient Name: Aldair Cordova  MRN: [id number]  Today's Date: 7/11/2020    Admit Date: 7/10/2020    Discharge Needs Assessment     Row Name 07/11/20 1107       Living Environment    Lives With  spouse    Name(s) of Who Lives With Patient  Silviano Cordova(wife-retired RN)     Current Living Arrangements  home/apartment/condo single story house with 2 steps to enter house with handrails    Primary Care Provided by  self    Provides Primary Care For  no one    Family Caregiver if Needed  spouse    Family Caregiver Names  Silviano(wife)     Quality of Family Relationships  helpful;involved;supportive    Able to Return to Prior Arrangements  yes       Resource/Environmental Concerns    Resource/Environmental Concerns  none    Home Accessibility Concerns  stairs to enter home    Transportation Concerns  car, none       Transition Planning    Patient/Family Anticipates Transition to  home with family;home with help/services    Patient/Family Anticipated Services at Transition  home health care    Transportation Anticipated  family or friend will provide       Discharge Needs Assessment    Readmission Within the Last 30 Days  no previous admission in last 30 days    Concerns to be Addressed  basic needs    Equipment Currently Used at Home  bipap/cpap    Anticipated Changes Related to Illness  inability to care for self    Equipment Needed After Discharge  other (see comments) Physical therapy has provided patient with rolling walker and 3 in 1 commode at [facility]    Discharge Facility/Level of Care Needs  home with home health    Provided Post Acute Provider List?  Refused    Refused Provider List Comment  Wife states patient has used [facility] in past and wants to use them again... Isabella Beaulieu RN,CCP         Discharge Plan     Row Name 07/11/20 1121       Plan    Plan  Home with wife and [facility] via private auto     Patient/Family in Agreement with Plan  yes     Plan Comments  Introduced self and explained role of CCP and facesheet verified with wife via phone.  Wife states she is patient's emergency contact([phone]) and they lives together in a single story house with 2 steps to enter house with handrails and she is a retired RN and will be with patient after discharge.  Wife states patient is independent with all ADLs and currently has a CPAP and [facility] has delivered a rolling walker and 3-1 commode to patients's hospital room for him to take home and she denies any other DME needs at discharge.  WIfe states patient's PCP is Adam Jacob MD and he uses [facility] pharmacy at [address] [place] [address] and denies any issues with affording or obtaining medications.  WIfe states patient has used [facility] in the past and would like to use [facility]- left referral on voicemail).  WIfe states the plan is for patient to return home with her and [facility] and she will provide transportation for him at discharge.... Isabella Beaulieu RN,CCP          Destination      Coordination has not been started for this encounter.      Durable Medical Equipment      Coordination has not been started for this encounter.      Dialysis/Infusion      Coordination has not been started for this encounter.      Home Medical Care      Service Provider Request Status Selected Services Address Phone Number Fax Number    [facility] Pending - Request Sent N/A [address] [facility] [address] [phone] --      Therapy      Coordination has not been started for this encounter.      Community Resources      Coordination has not been started for this encounter.        Expected Discharge Date and Time     Expected Discharge Date Expected Discharge Time    Jul 11, 2020         Demographic Summary     Row Name 07/11/20 1106       General Information    Admission Type  other (see comments) outpatient     Arrived From  home    Referral Source  nursing     Reason for Consult  discharge planning    Preferred Language  English     Used During This Interaction  no       Contact Information    Permission Granted to Share Info With  ;family/designee Silviano Cordova(wife)         Functional Status     Row Name 07/11/20 1107       Functional Status    Usual Activity Tolerance  moderate    Current Activity Tolerance  fair       Functional Status, IADL    Medications  independent    Meal Preparation  independent    Housekeeping  independent    Laundry  independent    Shopping  independent       Mental Status Summary    Recent Changes in Mental Status/Cognitive Functioning  no changes        Psychosocial    No documentation.       Abuse/Neglect    No documentation.       Legal    No documentation.       Substance Abuse    No documentation.       Patient Forms    No documentation.           Isabella Beaulieu RN

## 2020-07-11 NOTE — DISCHARGE PLACEMENT REQUEST
"Aldair Araiza (70 y.o. Male)     Date of Birth Social Security Number Address Home Phone MRN    1950  [address] [place] [address] [phone] [id number]    [employer] Marital Status          Episcopal        Admission Date Admission Type Admitting Provider Attending Provider Department, Room/Bed    7/10/20 Elective Rehan Fernández MD Brown, Reid B, MD [address], P891/1    Discharge Date Discharge Disposition Discharge Destination         [facility] [place] [facility]              Attending Provider:  Rehan Fernández MD    Allergies:  Xanax [Alprazolam]    Isolation:  None   Infection:  None   Code Status:  CPR    Ht:  182.9 cm (72\")   Wt:  109 kg (240 lb 4.8 oz)    Admission Cmt:  None   Principal Problem:  Primary osteoarthritis of right knee [M17.11] More...                 Active Insurance as of 7/10/2020     Primary Coverage     Payor Plan Insurance Group Employer/Plan Group    [facility] BLUE CROSS [facility] BLUE CROSS BLUE [facility] PPO [id number]     Payor Plan Address Payor Plan Phone Number Payor Plan Fax Number Effective Dates    [address] [phone]  6/22/2014 - None Entered    [facility] [id number]       Subscriber Name Subscriber Birth Date Member ID       ALDAIR ARAIZA 1950 [id number]           Secondary Coverage     Payor Plan Insurance Group Employer/Plan Group    MEDICARE MEDICARE A ONLY      Payor Plan Address Payor Plan Phone Number Payor Plan Fax Number Effective Dates    [address] [phone]  1/1/2017 - None Entered    [place] [id number]       Subscriber Name Subscriber Birth Date Member ID       ALDAIR ARAIZA 1950 [id number]                 Emergency Contacts      (Rel.) Home Phone Work Phone Mobile Phone    Silviano Araiza (Spouse) [phone] -- [phone]              "

## 2020-07-11 NOTE — DISCHARGE SUMMARY
Patient Name: Aldair Cordova  Patient YOB: 1950    Date of Admission:  7/10/2020  Date of Discharge:  7/11/2020  Discharge Diagnosis: TOTAL KNEE ARTHROPLASTY  Presenting Problem/History of Present Illness: Primary osteoarthritis of right knee [M17.11]  OA (osteoarthritis) of knee [M17.10]  Primary osteoarthritis of right knee [M17.11]  Admitting Physician: Dr Rehan Fernández  Consults:   Consults     No orders found for last 30 day(s).          DETAILS OF HOSPITAL STAY:  Patient is a 70 y.o. male was admitted to the floor following the above procedure and underwent an uncomplicated hospital stay.  Patient did well with physical therapy and was ambulating well without problems.  On the day of discharge the wound was clean, dry and intact and calf was soft and non tender and Homans sign was negative.  Patient was tolerating  without problems.  Patient will be discharged home.    Condition on Discharge:  Stable    Vital Signs  Temp:  [97.5 °F (36.4 °C)-98.9 °F (37.2 °C)] 98.4 °F (36.9 °C)  Heart Rate:  [] 73  Resp:  [16-20] 16  BP: (106-163)/(63-91) 134/78    LABS:      Admission on 07/10/2020   Component Date Value Ref Range Status   • Hemoglobin 07/11/2020 13.8  13.0 - 17.7 g/dL Final   • Hematocrit 07/11/2020 39.1  37.5 - 51.0 % Final   • Creatinine 07/11/2020 1.01  0.76 - 1.27 mg/dL Final   • eGFR Non African Amer 07/11/2020 73  >60 mL/min/1.73 Final       No results found.    Discharge Medications     Discharge Medications      New Medications      Instructions Start Date   aspirin 81 MG EC tablet  Replaces:  aspirin 81 MG tablet   81 mg, Oral, Every 12 Hours Scheduled      HYDROcodone-acetaminophen 7.5-325 MG per tablet  Commonly known as:  NORCO   1-2 po q 4-6 hr prn pain      ondansetron 4 MG tablet  Commonly known as:  ZOFRAN   4 mg, Oral, Every 6 Hours PRN      polyethylene glycol 17 g packet  Commonly known as:  MIRALAX   17 g, Oral, 2 Times Daily         Changes to Medications       Instructions Start Date   meloxicam 7.5 MG tablet  Commonly known as:  MOBIC  What changed:  additional instructions   7.5 mg, Oral, Daily      simvastatin 40 MG tablet  Commonly known as:  ZOCOR  What changed:  when to take this   40 mg, Oral, Nightly         Continue These Medications      Instructions Start Date   benazepril 10 MG tablet  Commonly known as:  LOTENSIN   10 mg, Oral, Daily      pantoprazole 40 MG EC tablet  Commonly known as:  PROTONIX   40 mg, Oral, Daily      tamsulosin 0.4 MG capsule 24 hr capsule  Commonly known as:  FLOMAX   1 capsule, Oral, Daily         Stop These Medications    aspirin 81 MG tablet  Replaced by:  aspirin 81 MG EC tablet     CHLORHEXIDINE GLUCONATE CLOTH EX     EQL Fish Oil 1000 MG capsule     Magnesium 250 MG tablet     Multi Complete capsule     mupirocin 2 % nasal ointment  Commonly known as:  BACTROBAN     TURMERIC PO            Activity at Discharge:     Discharge Instructions: Patient is to continue with physical therapy exercises daily and continue working with the physical therapist as ordered. Patient may weight bear as tolerated. Apply ice regularly. Patient may ice for long periods of time as long as ice is not directly on the skin. Patient instructed on frequent calf pumping exercises.  Patient also instructed on incentive spirometer during hospitalization and encouraged to continue to use at home regularly.    Dressing: The dressing is designed to be left in place until you return to the office in 2 weeks.  The suction unit should stop functioning at 7 days and the green light will switch to yellow.  At that point the suction unit and tubing can be disconnected at the port closest to the dressing.  The suction unit and tubing may be discarded.  You may shower immediately upon return home, you will need to turn the pump off by depressing the orange button once and then you may disconnect the pump and tubing at the connection port.  After showering, shake off the  excess water and reattach the tubing.  Restart the pump by depressing the orange button one time and you will notice the green light flashing again.  If the dressing becomes disloged or saturated it should be changed. Please refer to the MARY[facility] information sheet if you have any questions about the dressing.  If you have a home health nurse or therapist they can be contacted to assist with dressing change or repair. You may also call the [facility] dressing hotline for questions related to the dressing (1-405.103.5230). If there still other problems or questions related to the dressing despite these measures then you can contact Ashley at our office [phone].  If for some reason the MARYLU dressing is removed, after 7 days the wound can be gently cleaned with antibacterial soap then allowed to dry and covered with a dry sterile dressing. The wound should be covered at all times except while showering.  Patient may change dressings daily and prn using sterile 4x4 and paper tape, and should call if any unusual drainage, redness or swelling.*  Follow up appointment in 2 weeks - patient to call the office at [phone] to schedule.  Patient will be discharged on aspirin 81mg BID x weeks, then daily x 4weeks    Discharge Diagnosis:    Patient Active Problem List   Diagnosis   • Anxiety   • Arthritis   • Gastroesophageal reflux disease with esophagitis   • Hyperlipidemia   • Hypertension   • Nocturia   • Thrombocytopenia (CMS/HCC)   • Morbid obesity due to excess calories (CMS/HCC)   • Acute pain of left knee   • Osteoarthrosis, localized, primary, knee   • Health care maintenance   • KIEL on CPAP   • Hypersomnia with sleep apnea   • Obesity (BMI 30-39.9)   • Sleep deprivation   • Sleep related hypoxia   • Shift work sleep disorder   • OA (osteoarthritis) of knee   • Primary osteoarthritis of right knee       Follow-up Appointments  Future Appointments   Date Time Provider Department Center   7/24/2020  2:00 PM Luciana Whitaker APRN  [facility] None   6/28/2021  2:15 PM Deborah Bird MD [facility] None     Additional Instructions for the Follow-ups that You Need to Schedule     Referral to home health   As directed      PT to see for Home PT protocol. Daily for first week then 3x/ wk for second week. Please transition to OP PT as soon as Pt is ready. Do not continue home PT if  Pt is capable of transitioning to OP Pt.  Please assist in arranging OP PT.  Staples to be removed at f/u appointment in 2 weeks.    Order Comments:  PT to see for Home PT protocol. Daily for first week then 3x/ wk for second week. Please transition to OP PT as soon as Pt is ready. Do not continue home PT if  Pt is capable of transitioning to OP Pt.  Please assist in arranging OP PT.  Staples to be removed at f/u appointment in 2 weeks.     Face to Face Visit Date:  7/11/2020    Follow-up provider for Plan of Care?:  I will be treating the patient on an ongoing basis.  Please send me the Plan of Care for signature.    Follow-up provider:  SANDRA FERNÁNDEZ [[id number]]    Reason/Clinical Findings:  post op knee replacement    Describe mobility limitations that make leaving home difficult:  pain, swelling, weakness, limited mobility, difficulty ambulating without assistive device    Nursing/Therapeutic Services Requested:  Physicial Therapy                  Sandra Fernández MD  07/11/20  08:01

## 2020-07-11 NOTE — THERAPY EVALUATION
Patient Name: Aldair Cordova  : 1950    MRN: [id number]                              Today's Date: 2020       Admit Date: 7/10/2020    Visit Dx:     ICD-10-CM ICD-9-CM   1. Primary osteoarthritis of right knee M17.11 715.16     Patient Active Problem List   Diagnosis   • Anxiety   • Arthritis   • Gastroesophageal reflux disease with esophagitis   • Hyperlipidemia   • Hypertension   • Nocturia   • Thrombocytopenia (CMS/HCC)   • Morbid obesity due to excess calories (CMS/HCC)   • Acute pain of left knee   • Osteoarthrosis, localized, primary, knee   • Health care maintenance   • KIEL on CPAP   • Hypersomnia with sleep apnea   • Obesity (BMI 30-39.9)   • Sleep deprivation   • Sleep related hypoxia   • Shift work sleep disorder   • OA (osteoarthritis) of knee   • Primary osteoarthritis of right knee     Past Medical History:   Diagnosis Date   • Anxiety 2016   • Benign essential hypertension    • BPH (benign prostatic hyperplasia)    • GERD (gastroesophageal reflux disease)    • History of kidney stones    • HLD (hyperlipidemia)    • Nocturia 2016   • Obesity (BMI 30-39.9) 2018   • Osteoarthrosis, localized, primary, knee 12/15/2016   • Sleep apnea     uses cpap   • Sleep deprivation 2018   • Thrombocytopenia (CMS/HCC) 2016     Past Surgical History:   Procedure Laterality Date   • COLONOSCOPY  10/21/2016   • CYST REMOVAL      OFF NECK AND HEAD   • EXTRACORPOREAL SHOCK WAVE LITHOTRIPSY (ESWL)     • KNEE ARTHROSCOPY Left      General Information     Row Name 20 1001          PT Evaluation Time/Intention    Document Type  evaluation  -MW     Mode of Treatment  physical therapy  -MW     Row Name 20 1001          General Information    Patient Profile Reviewed?  yes  -MW     Prior Level of Function  independent:  -MW     Existing Precautions/Restrictions  fall  -MW     Barriers to Rehab  none identified  -MW     Row Name 20 1001          Home Main Entrance     Number of Stairs, Main Entrance  two  -MW     Row Name 07/11/20 1001          Cognitive Assessment/Intervention- PT/OT    Orientation Status (Cognition)  oriented x 4  -MW     Row Name 07/11/20 1001          Safety Issues, Functional Mobility    Safety Issues Affecting Function (Mobility)  ability to follow commands  -MW     Impairments Affecting Function (Mobility)  pain;range of motion (ROM);strength;endurance/activity tolerance  -MW     Comment, Safety Issues/Impairments (Mobility)  Gait belt used for safety.  -MW       User Key  (r) = Recorded By, (t) = Taken By, (c) = Cosigned By    Initials Name Provider Type    MW Kitty Centeno, PT Physical Therapist        Mobility     Row Name 07/11/20 1003          Bed Mobility Assessment/Treatment    Comment (Bed Mobility)  OOB  -     Row Name 07/11/20 1003          Transfer Assessment/Treatment    Comment (Transfers)  Pt was standing with nsg at start of session; stand to sit Sup A.  -     Row Name 07/11/20 1003          Gait/Stairs Assessment/Training    Gait/Stairs Assessment/Training  gait/ambulation independence;gait/ambulation assistive device  -     [place] Level (Gait)  supervision  -     Assistive Device (Gait)  walker, front-wheeled  -MW     Distance in Feet (Gait)  150  -MW     Pattern (Gait)  step-through  -MW     Deviations/Abnormal Patterns (Gait)  right sided deviations;gait speed decreased  -MW     Bilateral Gait Deviations  forward flexed posture  -MW     Negotiation (Stairs)  stairs independence;stairs assistive device  -     [place] Level (Stairs)  contact guard;verbal cues  -     Assistive Device (Stairs)  walker, front-wheeled  -MW     Handrail Location (Stairs)  left side (ascending)  -     Number of Steps (Stairs)  4  -MW     Comment (Gait/Stairs)  No LOB with ambulation; negotiated stairs with 1 rail on L+ folded RW on R, states that he will have someone to help him at home  -     Row Name 07/11/20 1003          Mobility  Assessment/Intervention    Extremity Weight-bearing Status  right lower extremity  -MW     Right Lower Extremity (Weight-bearing Status)  weight-bearing as tolerated (WBAT)  -MW       User Key  (r) = Recorded By, (t) = Taken By, (c) = Cosigned By    Initials Name Provider Type    Kitty Daniel PT Physical Therapist        Obj/Interventions     Row Name 07/11/20 1006          General ROM    GENERAL ROM COMMENTS  Right knee 5-70  -MW     Row Name 07/11/20 1006          MMT (Manual Muscle Testing)    General MMT Comments  Post-op weakness RLE, able to perform SLR  -MW     Row Name 07/11/20 1006          Therapeutic Exercise    Comment (Therapeutic Exercise)  Right TKA exercise program x 10 reps each  -MW     [facility] Name 07/11/20 1006          Static Standing Balance    Level of [place] (Supported Standing, Static Balance)  supervision  -MW     Assistive Device Utilized (Supported Standing, Static Balance)  walker, rolling  -MW       User Key  (r) = Recorded By, (t) = Taken By, (c) = Cosigned By    Initials Name Provider Type    Kitty Daniel PT Physical Therapist        Goals/Plan    No documentation.       Clinical Impression     [facility] Name 07/11/20 1007          Pain Assessment    Additional Documentation  Pain Scale: Numbers Pre/Post-Treatment (Group)  -MW     Row Name 07/11/20 1007          Pain Scale: Numbers Pre/Post-Treatment    Pain Scale: Numbers, Pretreatment  2/10  -MW     Pain Scale: Numbers, Post-Treatment  2/10  -MW     Pain Location - Side  Right  -MW     Pain Location  knee  -MW     Pain Intervention(s)  Repositioned;Ambulation/increased activity;Rest  -MW     [facility] Name 07/11/20 1007          Plan of Care Review    Plan of Care Reviewed With  patient  -     Outcome Summary  Pt is a 69 yo male s/p Right TKA POD #1. He lives with spouse in a home with 2 steps to enter, independent with mobility at baseline. He currently presents with typical post-op impairments related to TKA surgery including  decreased knee ROM and strength. Pt able to ambulate x 150 ft this date with RW and Sup A. Also able to negotiate stairs safely with CGA. Anticipates d/c home today. At this time PT recommends home with A, HHPT, RW, and BSC for discharge.  -     Row Name 07/11/20 1007          Physical Therapy Clinical Impression    Criteria for Skilled Interventions Met (PT Clinical Impression)  yes;treatment indicated;other (see comments) Acute PT x 1 visit for pt education and safety with mobility.  -     Row Name 07/11/20 1007          Vital Signs    O2 Delivery Pre Treatment  room air  -MW     O2 Delivery Intra Treatment  room air  -MW     O2 Delivery Post Treatment  room air  -MW     Pre Patient Position  Standing  -MW     Intra Patient Position  Standing  -MW     Post Patient Position  Sitting  -     Row Name 07/11/20 1007          Positioning and Restraints    Pre-Treatment Position  standing in room  -MW     Post Treatment Position  chair  -MW     In Chair  reclined;sitting;call light within reach;encouraged to call for assist;exit alarm on;RLE elevated  -       User Key  (r) = Recorded By, (t) = Taken By, (c) = Cosigned By    Initials Name Provider Type    Kitty Daniel, PT Physical Therapist        Outcome Measures     Row Name 07/11/20 1014          How much help from another person do you currently need...    Turning from your back to your side while in flat bed without using bedrails?  4  -MW     Moving from lying on back to sitting on the side of a flat bed without bedrails?  4  -MW     Moving to and from a bed to a chair (including a wheelchair)?  4  -MW     Standing up from a chair using your arms (e.g., wheelchair, bedside chair)?  4  -MW     Climbing 3-5 steps with a railing?  3  -MW     To walk in hospital room?  4  -MW     AM-PAC 6 Clicks Score (PT)  23  -MW     Row Name 07/11/20 1014          Functional Assessment    Outcome Measure Options  AM-PAC 6 Clicks Basic Mobility (PT)  -       User Key   (r) = Recorded By, (t) = Taken By, (c) = Cosigned By    Initials Name Provider Type    Kitty Daniel PT Physical Therapist        Physical Therapy Education                 Title: PT OT SLP Therapies (Resolved)     Topic: Physical Therapy (Resolved)     Point: Mobility training (Resolved)     Description:   Instruct learner(s) on safety and technique for assisting patient out of bed, chair or wheelchair.  Instruct in the proper use of assistive devices, such as walker, crutches, cane or brace.              Patient Friendly Description:   It's important to get you on your feet again, but we need to do so in a way that is safe for you. Falling has serious consequences, and your personal safety is the most important thing of all.        When it's time to get out of bed, one of us or a family member will sit next to you on the bed to give you support.     If your doctor or nurse tells you to use a walker, crutches, a cane, or a brace, be sure you use it every time you get out of bed, even if you think you don't need it.    Learning Progress Summary           Patient Acceptance, E, VU by  at 7/11/2020 1015    Comment:  PT eval/POT, safety with  mobility, HEP                   Point: Home exercise program (Resolved)     Description:   Instruct learner(s) on appropriate technique for monitoring, assisting and/or progressing patient with therapeutic exercises and activities.              Learning Progress Summary           Patient Acceptance, E, VU by  at 7/11/2020 1015    Comment:  PT eval/POT, safety with  mobility, HEP                   Point: Body mechanics (Resolved)     Description:   Instruct learner(s) on proper positioning and spine alignment for patient and/or caregiver during mobility tasks and/or exercises.              Learning Progress Summary           Patient Acceptance, E, VU by  at 7/11/2020 1015    Comment:  PT eval/POT, safety with  mobility, HEP                   Point: Precautions (Resolved)      Description:   Instruct learner(s) on prescribed precautions during mobility and gait tasks              Learning Progress Summary           Patient Acceptance, E, VU by  at 7/11/2020 1015    Comment:  PT eval/POT, safety with  mobility, HEP                               User Key     Initials Effective Dates Name Provider Type Discipline     09/17/19 -  Kitty Centeno PT Physical Therapist PT              PT Recommendation and Plan     Outcome Summary/Treatment Plan (PT)  Anticipated Equipment Needs at Discharge (PT): bedside commode, front wheeled walker(Equipment ordered and delivered to pt )  Anticipated Discharge Disposition (PT): home with assist, home with home health  Plan of Care Reviewed With: patient  Outcome Summary: Pt is a 71 yo male s/p Right TKA POD #1. He lives with spouse in a home with 2 steps to enter, independent with mobility at baseline. He currently presents with typical post-op impairments related to TKA surgery including decreased knee ROM and strength. Pt able to ambulate x 150 ft this date with RW and Sup A. Also able to negotiate stairs safely with CGA. Anticipates d/c home today. At this time PT recommends home with A, HHPT, RW, and BSC for discharge.     Time Calculation:   PT Charges     Row Name 07/11/20 1235 07/11/20 0935          Time Calculation    Start Time  0915  -MW  --     Stop Time  0945  -MW  --     Time Calculation (min)  30 min  -MW  --     PT Received On  --  07/11/20  -MW        Time Calculation- PT    Total Timed Code Minutes- PT  25 minute(s)  -MW  --       User Key  (r) = Recorded By, (t) = Taken By, (c) = Cosigned By    Initials Name Provider Type    Kitty Daniel PT Physical Therapist        Therapy Charges for Today     Code Description Service Date Service Provider Modifiers Qty    54756199583 HC PT EVAL MOD COMPLEXITY 2 7/11/2020 Kitty Centeno, PT GP 1    86787106169 HC GAIT TRAINING EA 15 MIN 7/11/2020 Kitty Centeno, PT GP 1    03759659760 HC PT  THER PROC EA 15 MIN 7/11/2020 Kitty Centeno, PT GP 1          PT G-Codes  Outcome Measure Options: AM-PAC 6 Clicks Basic Mobility (PT)  AM-PAC 6 Clicks Score (PT): 23    Kitty Centeno, PT  7/11/2020

## 2020-07-14 ENCOUNTER — TELEPHONE (OUTPATIENT)
Dept: ORTHOPEDIC SURGERY | Facility: CLINIC | Age: 70
End: 2020-07-14

## 2020-07-14 NOTE — TELEPHONE ENCOUNTER
Call returned to the patient.  Apparently he was set up to have [facility] home health however according to the [facility] liaison the referral was denied due to lack of staffing.  I checked with CCP at [facility].  The declines do come back to them however it does not flag the chart and unless they are looking for the declined they were not aware of the situation.  I have asked them to check with the CCP department to see if this can be remedied in the future to avoid starting home PT on her patients.  I did speak with [facility] liaison and they are planning to see the patient beginning today

## 2020-07-14 NOTE — TELEPHONE ENCOUNTER
PATIENT HAS NEVER HEARD FROM  PT, THEY WERE SUPPOSED TO CALL THEM Sunday, PLEASE ADVISE, THIS IS FOR R KNEE SX FROM LAST WK

## 2020-07-24 ENCOUNTER — OFFICE VISIT (OUTPATIENT)
Dept: ORTHOPEDIC SURGERY | Facility: CLINIC | Age: 70
End: 2020-07-24

## 2020-07-24 VITALS — HEIGHT: 72 IN | TEMPERATURE: 98 F | BODY MASS INDEX: 32.59 KG/M2

## 2020-07-24 DIAGNOSIS — R52 PAIN: Primary | ICD-10-CM

## 2020-07-24 DIAGNOSIS — M17.11 PRIMARY OSTEOARTHRITIS OF RIGHT KNEE: ICD-10-CM

## 2020-07-24 PROCEDURE — 99024 POSTOP FOLLOW-UP VISIT: CPT | Performed by: NURSE PRACTITIONER

## 2020-07-24 PROCEDURE — 73560 X-RAY EXAM OF KNEE 1 OR 2: CPT | Performed by: NURSE PRACTITIONER

## 2020-07-24 RX ORDER — HYDROCODONE BITARTRATE AND ACETAMINOPHEN 7.5; 325 MG/1; MG/1
TABLET ORAL
Qty: 60 TABLET | Refills: 0 | Status: SHIPPED | OUTPATIENT
Start: 2020-07-24 | End: 2021-11-15

## 2020-07-24 NOTE — PROGRESS NOTES
Aldair Cordova : 1950 MRN: [id number] DATE: 2020    DIAGNOSIS: 2 week follow up right total knee      SUBJECTIVE:Patient returns today for 2 week follow up of right total knee replacement. Patient reports doing well with no unusual complaints. Appears to be progressing appropriately.    OBJECTIVE:   Exam:. The incision is healing appropriately. No sign of infection. Range of motion is progressing as expected. The calf is soft and nontender with a negative Homans sign.    X-ray 2 views right knee were ordered and reviewed today secondary to recent surgery show well-placed well-positioned right total knee replacement.  No compared to views available    ASSESSMENT: 2 week status post right knee replacement.    PLAN: 1) Staples removed and steri strips applied   2) Order given for PT   3) Discontinue ZANE hose   4) Continue ice PRN   5) aspirin 81 mg orally every day for 1 month   6) Follow up in 6 weeks with repeat Xrays of right knee (3views)    Luciana Whitaker, RILEY  2020             Answers for HPI/ROS submitted by the patient on 2020   What is the primary reason for your visit?: Other  Please describe your symptoms.: Post-opp knee replacement  Have you had these symptoms before?: No  How long have you been having these symptoms?: 1-2 weeks  Please list any medications you are currently taking for this condition.: On file

## 2020-08-04 RX ORDER — ASPIRIN 81 MG/1
TABLET ORAL
Qty: 60 TABLET | Refills: 0 | Status: SHIPPED | OUTPATIENT
Start: 2020-08-04 | End: 2021-12-02 | Stop reason: HOSPADM

## 2020-08-19 ENCOUNTER — TELEPHONE (OUTPATIENT)
Dept: ORTHOPEDIC SURGERY | Facility: CLINIC | Age: 70
End: 2020-08-19

## 2020-08-19 NOTE — TELEPHONE ENCOUNTER
ERROR. Patient was scheduled with JUDY for tomorrow 8/20 at 0820 AM at [place] for OPNC / RIGHT Shldr.     Patient had RIGHT TKA 07/10/20 by [facility]. Patient called to report RIGHT Shldr nerve/s have become inflamed perhaps either how he's slept or compensating for RIGHT Knee. Has had shooting pain down RIGHT arm & into fingers. [facility]. Has PT appt today at 1300 but RIGHT Shldr pain has gotten worse.

## 2020-08-20 ENCOUNTER — OFFICE VISIT (OUTPATIENT)
Dept: ORTHOPEDIC SURGERY | Facility: CLINIC | Age: 70
End: 2020-08-20

## 2020-08-20 VITALS — BODY MASS INDEX: 31.17 KG/M2 | TEMPERATURE: 98.6 F | WEIGHT: 230.1 LBS | HEIGHT: 72 IN

## 2020-08-20 DIAGNOSIS — M25.511 RIGHT SHOULDER PAIN, UNSPECIFIED CHRONICITY: Primary | ICD-10-CM

## 2020-08-20 DIAGNOSIS — G56.21 CUBITAL TUNNEL SYNDROME ON RIGHT: ICD-10-CM

## 2020-08-20 DIAGNOSIS — R53.1 RIGHT SIDED WEAKNESS: ICD-10-CM

## 2020-08-20 PROCEDURE — 99214 OFFICE O/P EST MOD 30 MIN: CPT | Performed by: ORTHOPAEDIC SURGERY

## 2020-08-20 PROCEDURE — 73030 X-RAY EXAM OF SHOULDER: CPT | Performed by: ORTHOPAEDIC SURGERY

## 2020-08-20 RX ORDER — METHYLPREDNISOLONE 4 MG/1
TABLET ORAL
Qty: 21 TABLET | Refills: 0 | Status: SHIPPED | OUTPATIENT
Start: 2020-08-20 | End: 2020-12-11

## 2020-08-20 NOTE — PROGRESS NOTES
"   New Right Shoulder      Patient: Aldair Cordova        YOB: 1950    Medical Record Number: [id number]        Chief Complaints: right shoulder pain      History of Present Illness: This is a 70-year-old male who is right-hand dominant presents with right arm pain that is been going on about 3 to 4 weeks.  He is status post right total knee replacement July of this year knee is doing well but he is having this severe pain that sometimes in the back of his shoulder but mostly is in the elbow down to the fourth and fifth fingers.  States it aches and he states it is like electricity.\"  No prior history of similar symptoms symptoms are moderate constant burning with some swelling better with rest and certain positions worse at night he works at [employer] past medical history is remarkable for sleep apnea      Allergies:   Allergies   Allergen Reactions   • Xanax [Alprazolam] Other (See Comments)     Makes me mean       Medications:   Home Medications:  Current Outpatient Medications on File Prior to Visit   Medication Sig   • ASPIRIN 81 MG EC tablet TAKE 1 TABLET BY MOUTH EVERY 12 (TWELVE) HOURS FOR 60 DOSES.   • benazepril (LOTENSIN) 10 MG tablet Take 1 tablet by mouth Daily.   • meloxicam (MOBIC) 7.5 MG tablet Take 1 tablet by mouth Daily. (Patient taking differently: Take 7.5 mg by mouth Daily. INSTRUCTED PT TO FOLLOW MD INSTRUCTIONS REGARDING HOLDING FOR SURGERY)   • pantoprazole (PROTONIX) 40 MG EC tablet Take 1 tablet by mouth Daily.   • simvastatin (ZOCOR) 40 MG tablet Take 1 tablet by mouth Every Night. (Patient taking differently: Take 40 mg by mouth Daily.)   • tamsulosin (FLOMAX) 0.4 MG capsule 24 hr capsule Take 1 capsule by mouth Daily.   • HYDROcodone-acetaminophen (NORCO) 7.5-325 MG per tablet 1-2 po q 4-6 hr prn pain   • ondansetron (ZOFRAN) 4 MG tablet Take 1 tablet by mouth Every 6 (Six) Hours As Needed for Nausea or Vomiting for up to 10 doses.     Current Facility-Administered " Medications on File Prior to Visit   Medication   • Chlorhexidine Gluconate 2 % pads 2 each     Current Medications:  Scheduled Meds:  Thrombocytopenia hypertension GERD BPH hyperlipidemia continuous Infusions:  No current facility-administered medications for this visit.   PRN Meds:.    Past Medical History:   Diagnosis Date   • Anxiety 02/02/2016   • Benign essential hypertension    • BPH (benign prostatic hyperplasia)    • GERD (gastroesophageal reflux disease)    • History of kidney stones    • HLD (hyperlipidemia)    • Nocturia 02/02/2016   • Obesity (BMI 30-39.9) 1/29/2018   • Osteoarthrosis, localized, primary, knee 12/15/2016   • Sleep apnea     uses cpap   • Sleep deprivation 1/29/2018   • Thrombocytopenia (CMS/HCC) 02/08/2016        Past Surgical History:   Procedure Laterality Date   • COLONOSCOPY  10/21/2016   • CYST REMOVAL      OFF NECK AND HEAD   • EXTRACORPOREAL SHOCK WAVE LITHOTRIPSY (ESWL)     • KNEE ARTHROSCOPY Left    • TOTAL KNEE ARTHROPLASTY Right 7/10/2020    Procedure: TOTAL KNEE ARTHROPLASTY;  Surgeon: Rehan Fernández MD;  Location: [facility];  Service: Orthopedics;  Laterality: Right;        Social History     Occupational History   • Occupation: fork    Tobacco Use   • Smoking status: Never Smoker   • Smokeless tobacco: Never Used   Substance and Sexual Activity   • Alcohol use: Yes     Comment: VERY LITTLE   • Drug use: Never   • Sexual activity: Defer      Social History     Social History Narrative   • Not on file        Family History   Problem Relation Age of Onset   • Cancer Mother         lung   • Sleep apnea Other    • Lung cancer Other    • Malig Hyperthermia Neg Hx              Review of Systems: 14 point review of systems remarkable for the pertinent positives listed in the chart by the patient the remainder negative    Review of Systems      Physical Exam: 70 y.o. male  General Appearance:    Alert, cooperative, in no acute distress                   Vitals:     "08/20/20 0824   Temp: 98.6 °F (37 °C)   TempSrc: Temporal   Weight: 104 kg (230 lb 1.6 oz)   Height: 182.9 cm (72\")      Patient is alert and read ×3 no acute distress appears her above-listed at height weight and age.  Affect is normal respiratory rate is normal unlabored. Heart rate regular rate rhythm, sclera, dentition and hearing are normal for the purpose of this exam.    Ortho Exam Physical exam of the right shoulder reveals no overlying skin changes no lymphedema no lymphadenopathy.  The patient can actively flex to 180, abduction is similar external rotation is to 50, internal rotation to the upper lumbar spine.  Rotator cuff strength is 4+ to 5 over 5 with isometric strength testing without symptoms.  The patient has good cervical range of motion full and asymptomatic no radicular symptoms   Patient has good distal pulses  And evaluation of his elbow he does have a very tender ulnar nerve at the cubital tunnel he is a positive Tinel's there  He does demonstrate some weakness in the interosseous muscles in his hand and his ulnar innervated muscles      Radiology:   AP, Scapular Y and Axillary Lateral of the right shoulder were ordered/reviewed to evauate shoulder pain.  I have no comparative film he has some acromioclavicular arthritis otherwise no acute bony pathology  Imaging Results (Most Recent)     Procedure Component Value Units Date/Time    XR Shoulder 2+ View Right [[id number]] Resulted:  08/20/20 0744     Updated:  08/20/20 0818    Impression:       Ordering physician's impression is located in the Encounter Note dated 08/20/20. X-ray performed in the DR room.          Assessment/Plan: Right arm pain I really do not think this is shoulder I think his shoulder exam is normal I think this is nerve it but probably cubital tunnel.  He does have some defined weakness is been ongoing more than 3 weeks therefore plan is to get an EMG nerve conduction.  Beyond 3 weeks that test would be reliable.  I will " also start him on a steroid Dosepak to try to calm his symptoms down he knows to stop his meloxicam while he is on that.  Reason I would push to do nerve conduction now is that he is demonstrating some definitive weakness

## 2020-09-10 ENCOUNTER — OFFICE VISIT (OUTPATIENT)
Dept: ORTHOPEDIC SURGERY | Facility: CLINIC | Age: 70
End: 2020-09-10

## 2020-09-10 VITALS — BODY MASS INDEX: 31.15 KG/M2 | WEIGHT: 230 LBS | HEIGHT: 72 IN

## 2020-09-10 DIAGNOSIS — M17.11 PRIMARY OSTEOARTHRITIS OF RIGHT KNEE: Primary | ICD-10-CM

## 2020-09-10 DIAGNOSIS — M79.601 ARM PAIN, DIFFUSE, RIGHT: ICD-10-CM

## 2020-09-10 PROCEDURE — 73562 X-RAY EXAM OF KNEE 3: CPT | Performed by: ORTHOPAEDIC SURGERY

## 2020-09-10 PROCEDURE — 99024 POSTOP FOLLOW-UP VISIT: CPT | Performed by: ORTHOPAEDIC SURGERY

## 2020-09-10 RX ORDER — HYDROXYZINE PAMOATE 25 MG/1
CAPSULE ORAL
COMMUNITY
Start: 2020-08-27 | End: 2020-12-11

## 2020-09-10 NOTE — PROGRESS NOTES
Aldair Cordova : 1950 MRN: [id number] DATE: 9/10/2020    DIAGNOSIS: 8 week follow up right total knee  , c/o right arm pain and numbness, and weakness x 4 weeks    SUBJECTIVE:Patient returns today for 8 week follow up of right total knee replacement. Patient reports doing well with no unusual complaints. States having worsening right arm neurologic sx x 4 weeks    OBJECTIVE:   Exam:. The incision is well healed. No sign of infection. Range of motion is measured at 2 to 120. The calf is soft and nontender with a negative Homans sign. Strength is progressing and the patient is ambulating appropriately.Decrease right  strength, decrease right hand sensibility diffuse      DIAGNOSTIC STUDIES  Xrays: 3 views of the right knee (AP, lateral, and sunrise) were ordered and reviewed for evaluation of recent knee replacement. They demonstrate a well positioned, well aligned knee replacement without complicating factors noted. In comparison with previous films there has been no change.    ASSESSMENT: 8 week status post right knee replacement. Right arm radiculopathy    PLAN: 1) Continue with PT exercises as prescribed   2) Follow up in 10 months    3) has emg pending but cant get in for 2 months. Given progressive sx I kenn [facility] ahead [facility]       Rehan Fernández MD  9/10/2020

## 2020-09-23 ENCOUNTER — TELEPHONE (OUTPATIENT)
Dept: ORTHOPEDIC SURGERY | Facility: CLINIC | Age: 70
End: 2020-09-23

## 2020-09-23 DIAGNOSIS — M54.2 NECK PAIN: Primary | ICD-10-CM

## 2020-09-25 ENCOUNTER — APPOINTMENT (OUTPATIENT)
Dept: MRI IMAGING | Facility: HOSPITAL | Age: 70
End: 2020-09-25

## 2020-10-05 ENCOUNTER — TELEPHONE (OUTPATIENT)
Dept: ORTHOPEDIC SURGERY | Facility: CLINIC | Age: 70
End: 2020-10-05

## 2020-10-13 ENCOUNTER — TELEPHONE (OUTPATIENT)
Dept: ORTHOPEDIC SURGERY | Facility: CLINIC | Age: 70
End: 2020-10-13

## 2020-10-13 NOTE — TELEPHONE ENCOUNTER
I am not sure how my name got attached to this order-I think the facility called wanting to add contrast so I said fine.  I have never seen him but it sounds like he needs to see me.  I need him to bring a CD with the actual images from the [facility] facility-hopefully they gave him 1.  They should be giving every body 1.  In any event make an appointment for next available date for new neck exam.  Thank you          Exam Information    PATIENT HAS BEEN CONTACTED ABOUT THIS AND HAS AN APPT WITH DR. MENON ON 11/24.

## 2020-10-13 NOTE — TELEPHONE ENCOUNTER
THE REPORT FOR THE MRI IS SCANNED IN IMAGING.   SOMEONE PUT IN THAT HEENA WAS THE ORDERING DOCTOR AND HE DID LOOK AT THE REPORT AND IMAGES. THE PATIENT DOES HAVE AN APPT WITH HEENA ON 11/24.

## 2020-11-09 ENCOUNTER — HOSPITAL ENCOUNTER (OUTPATIENT)
Dept: INFUSION THERAPY | Facility: HOSPITAL | Age: 70
Discharge: HOME | End: 2020-11-09
Admitting: PSYCHIATRY & NEUROLOGY

## 2020-11-09 DIAGNOSIS — G56.21 CUBITAL TUNNEL SYNDROME ON RIGHT: ICD-10-CM

## 2020-11-09 PROCEDURE — 95909 NRV CNDJ TST 5-6 STUDIES: CPT | Performed by: PSYCHIATRY & NEUROLOGY

## 2020-11-09 PROCEDURE — 95886 MUSC TEST DONE W/N TEST COMP: CPT

## 2020-11-09 PROCEDURE — 95909 NRV CNDJ TST 5-6 STUDIES: CPT

## 2020-11-09 PROCEDURE — 95886 MUSC TEST DONE W/N TEST COMP: CPT | Performed by: PSYCHIATRY & NEUROLOGY

## 2020-11-09 NOTE — PROCEDURES
EMG and Nerve Conduction Studies    I.      Instrument used: Neuromax 1002  II.     Please see data sheets for tabular summary of NCS and details on methods, temperatures and lab standards.   III.    EMG muscles tested for upper extremity studies include the deltoid, biceps, triceps, pronator teres, extensor digitorum communis, first dorsal interosseous and abductor pollicis brevis.    IV.   EMG muscles tested for lower extremity studies include the vastus lateralis, tibialis anterior, peroneus longus, medial gastrocnemius and extensor digitorum brevis.    V.    Additional muscles tested as needed.  Paraspinal muscles tested as needed.   VI.   Please see data sheets for tabular summary of EMG findings.   VII. The complete report includes the data sheets.      Indication: Cubital tunnel syndrome  History: 70-year-old male who had knee surgery and subsequently developed right shoulder pain and about 2 weeks postop he noticed weakness developing in the right hand.  He describes troubles with  and pain through the cubital tunnel area of the right elbow.      Ht: 182.9 cm  Wt: 104 kg  HbA1C: No results found for: HGBA1C  TSH: No results found for: TSH    Technical summary:  Nerve conduction studies were obtained in the right arm with 1 comparison on the left.  Skin temperatures were at least 32 °C measured on the palms.  Needle examination was obtained on selected muscles of the right arm.    Results:  1.  Normal right median sensory study.  2.  Normal right ulnar sensory latency with low amplitude of 7.7 µV.  3.  Normal right median motor study.  4.  Slow right ulnar motor conduction velocity below the elbow at 47.1 m/s with low normal velocity in the short segment across the elbow at 49.2 m/s.  The distal latency was normal.  The amplitude was at the low end of normal from the wrist at 4.8 mV but lower from below the elbow at 3.4 mV and above the elbow only 2.9 mV which suggests evidence of conduction block.  The  study was repeated recording over the first dorsal interosseous showing a slow velocity below the elbow at 44.9 m/s and a slow velocity in the short segment across the elbow at 42.9 m/s.  There was also a reduction of amplitudes from 4.3 mV from wrist to 2.9 mV from below the elbow and 1.8 mV from above the elbow again suggesting conduction block.  Slow left ulnar motor conduction velocity in the short segment across the elbow at 46.2 m/s with normal velocity below the elbow.  Normal distal latency and amplitudes.  5.  Needle examination of selected muscles of the right arm showed multiple abnormalities.  There were increased insertional activities with fibrillations and positive sharp waves in the flexor pollicis longus, first dorsal interosseous, flexor digitorum profundus to digits 4 and 5 and the extensor digitorum communis muscles.  A few positive sharp waves are seen in the abductor pollicis brevis.  There were no voluntary motor units in the flexor pollicis longus.  There was an increased number of large motor units in the first dorsal interosseous, flexor digitorum profundus to digit 4 and 5 and the extensor digitorum commonness muscles.  There were a few large motor units in the abductor pollicis brevis.  The recruitment patterns showed significant increased firing rates in the first dorsal interosseous with milder changes in the flexor digitorum profundus to digits 4 and 5 and the extensor digitorum communis with reduced interference patterns in all of those muscles.  The more proximal muscles tested showed normal insertional activities, motor units and recruitment patterns.  Cervical paraspinals at C7/T1 showed no abnormality.    Impression:  Complex abnormal study.  The study is most consistent with an acute/subacute right lower trunk brachial plexopathy.  A combination of a nonlocalizable right ulnar neuropathy with a C8 radiculopathy is also possible.  In addition there was a moderate left ulnar  neuropathy at the elbow.  Clinical correlation is suggested.  Study results were discussed with the patient.    Mert Milton M.D.      Addendum:  Brief power testing demonstrates at least moderate weakness of the first dorsal interosseous, abductor digiti quinti, flexor pollicis longus, extensor digitorum commonness muscles in the right arm.  GNS        Dictated utilizing Dragon dictation.

## 2020-11-10 ENCOUNTER — TELEPHONE (OUTPATIENT)
Dept: ORTHOPEDIC SURGERY | Facility: CLINIC | Age: 70
End: 2020-11-10

## 2020-11-10 NOTE — TELEPHONE ENCOUNTER
----- Message from Rosa Irizarry MD sent at 11/10/2020  7:56 AM EST -----  Can you please tell him that it does look like he has carpal tunnel syndrome but also has pressure on a nerve in his brachial plexus.  I would like him to see Dr. Arley Lee

## 2020-11-24 ENCOUNTER — OFFICE VISIT (OUTPATIENT)
Dept: ORTHOPEDIC SURGERY | Facility: CLINIC | Age: 70
End: 2020-11-24

## 2020-11-24 VITALS — TEMPERATURE: 98.2 F | WEIGHT: 230 LBS | HEIGHT: 72 IN | BODY MASS INDEX: 31.15 KG/M2

## 2020-11-24 DIAGNOSIS — M47.22 CERVICAL SPONDYLOSIS WITH RADICULOPATHY: ICD-10-CM

## 2020-11-24 DIAGNOSIS — M54.2 CERVICAL SPINE PAIN: Primary | ICD-10-CM

## 2020-11-24 DIAGNOSIS — M54.2 NECK PAIN: ICD-10-CM

## 2020-11-24 PROCEDURE — 72040 X-RAY EXAM NECK SPINE 2-3 VW: CPT | Performed by: ORTHOPAEDIC SURGERY

## 2020-11-24 PROCEDURE — 99214 OFFICE O/P EST MOD 30 MIN: CPT | Performed by: ORTHOPAEDIC SURGERY

## 2020-11-24 NOTE — PROGRESS NOTES
New patient or new problem visit    Chief Complaint   Patient presents with   • Cervical Spine - Initial Evaluation, Pain       HPI: He is seen today at request of Dr. Rehan Fernández for complaints of neck pain radiating to the left elbow.  Some weakness in the right hand  strength.  Started back in July a couple of weeks following the surgery.  Not clear what caused the onset.  Physical therapy for the knee is been impeded by this and to some extent.  He has undergone an EMG along the way for this pain which is dull 1 out of 10 worse with activity but associated with weakness about which she is most concerned.  He is mostly concerned about clumsiness in the hand    PFSH: See chart- reviewed    Review of Systems   Eyes: Positive for redness and itching.   Allergic/Immunologic: Positive for environmental allergies.   Neurological: Positive for weakness.       PE: Constitutional: Vital signs above-noted.  Awake, alert and oriented    Psychiatric: Affect and insight do not appear grossly disturbed.    Pulmonary: Breathing is unlabored, color is good.    Skin: Warm, dry and normal turgor    Cardiac: Radial pulses intact.  No edema.    Eyesight and hearing appear adequate for examination purposes      Musculoskeletal:  Posture is unremarkable to coronal and sagittal inspection.  Motion appears undisturbed.  The skin about the area is intact.  There is no palpable or visible deformity.  There is no local spasm. There is mild tenderness to percussion and palpation of the spine.     Neurologic:  In the bilateral upper extremities there is no evidence of atrophy.  Motor function is undisturbed in shoulder abduction, elbow flexion, wrist extension, finger extension, triceps extension, or finger abduction.  Sensation appears symmetrically intact to light touch.  Reflexes are 2+ and symmetrical in the biceps, brachioradialis, and triceps. Liu test is negative.  Gait appears undisturbed.  Spurling test is  negative.      MEDICAL DECISION MAKING    XRAY: Plain film x-rays show loss of lordosis and multilevel spondylosis.  No comparison views are available.  MRI shows multilevel stenosis worse at C5-6 with some cord deformation but no signal change.  Even to 3 shows good degree of stenosis but there is still the most room left remaining at that otherwise capacious level.  The 5 6 level probably best matches his neurologic complaints    Other: EMG did suggest ulnar neuropathy and possible brachial plexopathy in addition to C8 radiculopathy.  This does not match his clinical findings as well    Impression: Pretty good chance is arm symptoms are coming from the neck-seems unlikely those changes were coincidental.    Plan: For now epidurals.  If they work they will be confirmatory of the spinal radicular nature of the pain.  Then if symptoms recur and surgery is needed he is likely to require myelogram and CT scan ahead of time for further elucidation of contributing levels, if possible.  I will see him back in 6 weeks

## 2020-12-07 ENCOUNTER — TELEPHONE (OUTPATIENT)
Dept: ORTHOPEDIC SURGERY | Facility: CLINIC | Age: 70
End: 2020-12-07

## 2020-12-07 RX ORDER — CEPHALEXIN 500 MG/1
CAPSULE ORAL
Qty: 4 CAPSULE | Refills: 5 | Status: SHIPPED | OUTPATIENT
Start: 2020-12-07 | End: 2020-12-11

## 2020-12-11 ENCOUNTER — ANESTHESIA (OUTPATIENT)
Dept: PAIN MEDICINE | Facility: HOSPITAL | Age: 70
End: 2020-12-11

## 2020-12-11 ENCOUNTER — ANESTHESIA EVENT (OUTPATIENT)
Dept: PAIN MEDICINE | Facility: HOSPITAL | Age: 70
End: 2020-12-11

## 2020-12-11 ENCOUNTER — HOSPITAL ENCOUNTER (OUTPATIENT)
Dept: PAIN MEDICINE | Facility: HOSPITAL | Age: 70
Discharge: HOME | End: 2020-12-11

## 2020-12-11 ENCOUNTER — HOSPITAL ENCOUNTER (OUTPATIENT)
Dept: GENERAL RADIOLOGY | Facility: HOSPITAL | Age: 70
Discharge: HOME | End: 2020-12-11

## 2020-12-11 VITALS
SYSTOLIC BLOOD PRESSURE: 119 MMHG | OXYGEN SATURATION: 96 % | HEIGHT: 72 IN | RESPIRATION RATE: 16 BRPM | BODY MASS INDEX: 33.18 KG/M2 | WEIGHT: 245 LBS | TEMPERATURE: 97.7 F | DIASTOLIC BLOOD PRESSURE: 72 MMHG | HEART RATE: 64 BPM

## 2020-12-11 DIAGNOSIS — R52 PAIN: ICD-10-CM

## 2020-12-11 DIAGNOSIS — M48.02 CERVICAL SPINAL STENOSIS: Primary | ICD-10-CM

## 2020-12-11 PROCEDURE — 77003 FLUOROGUIDE FOR SPINE INJECT: CPT

## 2020-12-11 PROCEDURE — 25010000002 METHYLPREDNISOLONE PER 80 MG: Performed by: ANESTHESIOLOGY

## 2020-12-11 PROCEDURE — C1755 CATHETER, INTRASPINAL: HCPCS

## 2020-12-11 RX ORDER — FENTANYL CITRATE 50 UG/ML
50 INJECTION, SOLUTION INTRAMUSCULAR; INTRAVENOUS AS NEEDED
Status: DISCONTINUED | OUTPATIENT
Start: 2020-12-11 | End: 2020-12-12 | Stop reason: HOSPADM

## 2020-12-11 RX ORDER — SODIUM CHLORIDE 0.9 % (FLUSH) 0.9 %
1-10 SYRINGE (ML) INJECTION AS NEEDED
Status: DISCONTINUED | OUTPATIENT
Start: 2020-12-11 | End: 2020-12-12 | Stop reason: HOSPADM

## 2020-12-11 RX ORDER — METHYLPREDNISOLONE ACETATE 80 MG/ML
80 INJECTION, SUSPENSION INTRA-ARTICULAR; INTRALESIONAL; INTRAMUSCULAR; SOFT TISSUE ONCE
Status: COMPLETED | OUTPATIENT
Start: 2020-12-11 | End: 2020-12-11

## 2020-12-11 RX ORDER — LIDOCAINE HYDROCHLORIDE 10 MG/ML
1 INJECTION, SOLUTION INFILTRATION; PERINEURAL ONCE AS NEEDED
Status: DISCONTINUED | OUTPATIENT
Start: 2020-12-11 | End: 2020-12-12 | Stop reason: HOSPADM

## 2020-12-11 RX ORDER — MIDAZOLAM HYDROCHLORIDE 1 MG/ML
1 INJECTION INTRAMUSCULAR; INTRAVENOUS AS NEEDED
Status: DISCONTINUED | OUTPATIENT
Start: 2020-12-11 | End: 2020-12-12 | Stop reason: HOSPADM

## 2020-12-11 RX ADMIN — METHYLPREDNISOLONE ACETATE 80 MG: 80 INJECTION, SUSPENSION INTRA-ARTICULAR; INTRALESIONAL; INTRAMUSCULAR; SOFT TISSUE at 09:24

## 2020-12-11 NOTE — ANESTHESIA PROCEDURE NOTES
PAIN Epidural block    Pre-sedation assessment completed: 12/11/2020 9:12 AM    Patient reassessed immediately prior to procedure    Patient location during procedure: pain clinic  Indication:procedure for pain  Performed By  Anesthesiologist: Alex Milton MD  Preanesthetic Checklist  Completed: patient identified, site marked, surgical consent, pre-op evaluation, timeout performed, IV checked, risks and benefits discussed and monitors and equipment checked  Additional Notes  Performed under fluoroscopy  Post-Op Diagnosis Codes:     * Cervical disc disorder with radiculopathy (M50.10)     * Cervical spinal stenosis (M48.02)     * Foraminal stenosis of cervical region (M48.02)  Prep:  Pt Position:prone  Sterile Tech:cap, gloves, mask and sterile barrier  Prep:chlorhexidine gluconate and isopropyl alcohol  Monitoring:blood pressure monitoring, continuous pulse oximetry and EKG  Procedure:Sedation: no     Approach:midline  Guidance: fluoroscopy  Location:cervical (Intralaminar)  Level:6-7  Needle Type:Tuohy  Needle Gauge:20 G  Aspiration:negative  Medications:  Depomedrol:80 mg  Preservative Free Saline:2mL  Isovue:2mL  Comments:Needle placement confirmed with epidural spread of contrast injection.  Post Assessment:  Post-procedure: Bandaid.  Pt Tolerance:patient tolerated the procedure well with no apparent complications  Complications:no

## 2020-12-11 NOTE — H&P
[facility]    History and Physical    Patient Name: Aldair Cordova  :  1950  MRN:  [id number]  Date of Admission: 2020    Subjective     Patient is a 70 y.o. male presents with chief complaint of acute neck, arm: right and hand: right pain with numbness and weakness of the hand.  Onset of symptoms was abrupt starting several months ago.  Symptoms are associated/aggravated by lifting or standing. Symptoms improve with massage, relaxation and rest.   His MRI was reported as multilevel cervical spondylotic protrusions with stenosis and severe foraminal stenosis. Dr Osborne suggested epidurals for now.    The following portions of the patients history were reviewed and updated as appropriate: current medications, allergies, past medical history, past surgical history, past family history, past social history and problem list                Objective     Past Medical History:   Past Medical History:   Diagnosis Date   • Anxiety 2016   • Benign essential hypertension    • BPH (benign prostatic hyperplasia)    • GERD (gastroesophageal reflux disease)    • History of kidney stones    • HLD (hyperlipidemia)    • Nocturia 2016   • Obesity (BMI 30-39.9) 2018   • Osteoarthrosis, localized, primary, knee 12/15/2016   • Sleep apnea     uses cpap   • Sleep deprivation 2018   • Thrombocytopenia (CMS/HCC) 2016     Past Surgical History:   Past Surgical History:   Procedure Laterality Date   • COLONOSCOPY  10/21/2016   • CYST REMOVAL      OFF NECK AND HEAD   • EXTRACORPOREAL SHOCK WAVE LITHOTRIPSY (ESWL)     • KNEE ARTHROSCOPY Left    • TOTAL KNEE ARTHROPLASTY Right 7/10/2020    Procedure: TOTAL KNEE ARTHROPLASTY;  Surgeon: Rehan Fernández MD;  Location: [facility];  Service: Orthopedics;  Laterality: Right;     Family History:   Family History   Problem Relation Age of Onset   • Cancer Mother         lung   • Sleep apnea Other    • Lung cancer Other    • Malig Hyperthermia Neg Hx       Social History:   Social History     Tobacco Use   • Smoking status: Never Smoker   • Smokeless tobacco: Never Used   Substance Use Topics   • Alcohol use: Yes     Comment: VERY LITTLE   • Drug use: Never       Vital Signs Range for the last 24 hours  Temperature:     Temp Source:     BP:     Pulse:     Respirations:     SPO2:     O2 Amount (l/min):     O2 Devices     Weight:           --------------------------------------------------------------------------------    Current Outpatient Medications   Medication Sig Dispense Refill   • benazepril (LOTENSIN) 10 MG tablet Take 1 tablet by mouth Daily. 90 tablet 1   • HYDROcodone-acetaminophen (NORCO) 7.5-325 MG per tablet 1-2 po q 4-6 hr prn pain 60 tablet 0   • meloxicam (MOBIC) 7.5 MG tablet Take 1 tablet by mouth Daily. (Patient taking differently: Take 7.5 mg by mouth Daily. INSTRUCTED PT TO FOLLOW MD INSTRUCTIONS REGARDING HOLDING FOR SURGERY) 90 tablet 1   • pantoprazole (PROTONIX) 40 MG EC tablet Take 1 tablet by mouth Daily. 90 tablet 1   • simvastatin (ZOCOR) 40 MG tablet Take 1 tablet by mouth Every Night. (Patient taking differently: Take 40 mg by mouth Daily.) 90 tablet 1   • tamsulosin (FLOMAX) 0.4 MG capsule 24 hr capsule Take 1 capsule by mouth Daily.     • ASPIRIN 81 MG EC tablet TAKE 1 TABLET BY MOUTH EVERY 12 (TWELVE) HOURS FOR 60 DOSES. 60 tablet 0     Current Facility-Administered Medications   Medication Dose Route Frequency Provider Last Rate Last Admin   • fentaNYL citrate (PF) (SUBLIMAZE) injection 50 mcg  50 mcg Intravenous PRN Alex Milton MD       • iopamidol (ISOVUE-M 200) injection 41%  12 mL Epidural Once in imaging Alex Milton MD       • lidocaine (XYLOCAINE) 1 % injection 1 mL  1 mL Intradermal Once PRN Alex Milton MD       • methylPREDNISolone acetate (DEPO-medrol) injection 80 mg  80 mg Intra-articular Once Alex Milton MD       • midazolam (VERSED) injection 1 mg  1 mg Intravenous PRN Alex Milton MD       • sodium  chloride 0.9 % flush 1-10 mL  1-10 mL Intravenous PRN Alex Milton MD         Facility-Administered Medications Ordered in Other Encounters   Medication Dose Route Frequency Provider Last Rate Last Admin   • Chlorhexidine Gluconate 2 % pads 2 each  2 pad Apply externally BID Rehan Fernández MD           --------------------------------------------------------------------------------  Assessment/Plan      Anesthesia Evaluation           Pain impairs ability to perform ADLs: Dressing, Exercise/Activity, Working and Driving  Modalities previously tried to control pain with limited effectiveness within the last 4-6 weeks: Rest, Heat, Steroids and Physical therapy     Airway   Mallampati: II  Dental      Pulmonary - normal exam   Cardiovascular - normal exam        Neuro/Psych  (+)   Strength normal in all four extremities,  Sensory Deficit,    GI/Hepatic/Renal/Endo      Musculoskeletal     Abdominal  - normal exam   Substance History      OB/GYN          Other   blood dyscrasia thrombocytopenia,       Other Comment: Last platelet count [id number]. No recent bruising.           Diagnosis and Plan    Treatment Plan  ASA 3      Procedures: Cervical Epidural Steroid Injection(JOSEPH), With fluoroscopy,       Anesthetic plan and risks discussed with patient.          Diagnosis     * Cervical disc disorder with radiculopathy [M50.10]     * Cervical spinal stenosis [M48.02]     * Foraminal stenosis of cervical region [M48.02]

## 2021-01-05 ENCOUNTER — OFFICE VISIT (OUTPATIENT)
Dept: ORTHOPEDIC SURGERY | Facility: CLINIC | Age: 71
End: 2021-01-05

## 2021-01-05 VITALS — TEMPERATURE: 98.3 F | BODY MASS INDEX: 33.15 KG/M2 | HEIGHT: 72 IN | WEIGHT: 244.71 LBS

## 2021-01-05 DIAGNOSIS — M47.22 CERVICAL SPONDYLOSIS WITH RADICULOPATHY: Primary | ICD-10-CM

## 2021-01-05 PROCEDURE — 99213 OFFICE O/P EST LOW 20 MIN: CPT | Performed by: ORTHOPAEDIC SURGERY

## 2021-01-05 RX ORDER — MAGNESIUM OXIDE 400 MG/1
400 TABLET ORAL DAILY
COMMUNITY
End: 2021-12-02 | Stop reason: HOSPADM

## 2021-01-05 RX ORDER — DIPHENOXYLATE HYDROCHLORIDE AND ATROPINE SULFATE 2.5; .025 MG/1; MG/1
1 TABLET ORAL DAILY
COMMUNITY
End: 2021-12-02 | Stop reason: HOSPADM

## 2021-01-05 NOTE — PROGRESS NOTES
He is slowly improving may be 5% better than last time.  Epidural did seem to help somewhat.  Pretty good strength in arm abduction and in the hands today.  Reviewed the MRI and he has stenosis from two three to to C5-6.  The five six level is probably most symptomatic but it pretty arbitrary picking these apart.  Since is not myelopathic, he has no myelomalacia or any prior symptoms, and since this is complex and multilevel I think the best thing for now is to continue epidurals and follow him along.  If he fails to improve I may have Samuel Gómez see him for possible laminoplasty as he does have a pretty good lordosis although most of his compression is anterior.

## 2021-01-18 ENCOUNTER — ANESTHESIA (OUTPATIENT)
Dept: PAIN MEDICINE | Facility: HOSPITAL | Age: 71
End: 2021-01-18

## 2021-01-18 ENCOUNTER — ANESTHESIA EVENT (OUTPATIENT)
Dept: PAIN MEDICINE | Facility: HOSPITAL | Age: 71
End: 2021-01-18

## 2021-01-18 ENCOUNTER — HOSPITAL ENCOUNTER (OUTPATIENT)
Dept: PAIN MEDICINE | Facility: HOSPITAL | Age: 71
Discharge: HOME | End: 2021-01-18

## 2021-01-18 ENCOUNTER — HOSPITAL ENCOUNTER (OUTPATIENT)
Dept: GENERAL RADIOLOGY | Facility: HOSPITAL | Age: 71
Discharge: HOME | End: 2021-01-18

## 2021-01-18 VITALS
OXYGEN SATURATION: 96 % | RESPIRATION RATE: 16 BRPM | DIASTOLIC BLOOD PRESSURE: 65 MMHG | TEMPERATURE: 97.5 F | SYSTOLIC BLOOD PRESSURE: 132 MMHG | HEART RATE: 69 BPM

## 2021-01-18 DIAGNOSIS — R52 PAIN: ICD-10-CM

## 2021-01-18 DIAGNOSIS — M50.30 DEGENERATIVE, INTERVERTEBRAL DISC, CERVICAL: Primary | ICD-10-CM

## 2021-01-18 PROCEDURE — 77003 FLUOROGUIDE FOR SPINE INJECT: CPT

## 2021-01-18 PROCEDURE — 0 IOPAMIDOL 41 % SOLUTION: Performed by: ANESTHESIOLOGY

## 2021-01-18 PROCEDURE — C1755 CATHETER, INTRASPINAL: HCPCS

## 2021-01-18 PROCEDURE — 25010000002 DEXAMETHASONE SODIUM PHOSPHATE 10 MG/ML SOLUTION: Performed by: ANESTHESIOLOGY

## 2021-01-18 RX ORDER — DEXAMETHASONE SODIUM PHOSPHATE 10 MG/ML
10 INJECTION, SOLUTION INTRAMUSCULAR; INTRAVENOUS ONCE
Status: COMPLETED | OUTPATIENT
Start: 2021-01-18 | End: 2021-01-18

## 2021-01-18 RX ORDER — OMEGA-3 FATTY ACIDS CAP DELAYED RELEASE 1000 MG 1000 MG
1000 CAPSULE DELAYED RELEASE ORAL DAILY
COMMUNITY
End: 2021-12-02 | Stop reason: HOSPADM

## 2021-01-18 RX ADMIN — DEXAMETHASONE SODIUM PHOSPHATE 10 MG: 10 INJECTION, SOLUTION INTRAMUSCULAR; INTRAVENOUS at 14:08

## 2021-01-18 RX ADMIN — IOPAMIDOL 2 ML: 408 INJECTION, SOLUTION INTRATHECAL at 14:07

## 2021-01-18 NOTE — H&P
[facility]    History and Physical    Patient Name: Aldair Cordova  :  1950  MRN:  [id number]  Date of Admission: 2021    Subjective     The patient is a 70-year-old male who has pain which originates in his cervical spine and radiates to his right hand.  He reports approximate 10% relief following his last cervical epidural steroid injection.  Today his pain ranges between a 3 and a 5/10.  He is here for cervical epidural steroid injection #2.    The following portions of the patients history were reviewed and updated as appropriate: current medications, allergies, past medical history, past surgical history, past family history, past social history and problem list                Objective     Past Medical History:   Past Medical History:   Diagnosis Date   • Anxiety 2016   • Benign essential hypertension    • BPH (benign prostatic hyperplasia)    • GERD (gastroesophageal reflux disease)    • History of kidney stones    • HLD (hyperlipidemia)    • Neck pain    • Nocturia 2016   • Obesity (BMI 30-39.9) 2018   • Osteoarthrosis, localized, primary, knee 12/15/2016   • Sleep apnea     uses cpap   • Sleep deprivation 2018   • Thrombocytopenia (CMS/HCC) 2016     Past Surgical History:   Past Surgical History:   Procedure Laterality Date   • COLONOSCOPY  10/21/2016   • CYST REMOVAL      OFF NECK AND HEAD   • EPIDURAL BLOCK     • EXTRACORPOREAL SHOCK WAVE LITHOTRIPSY (ESWL)     • KNEE ARTHROSCOPY Left    • TOTAL KNEE ARTHROPLASTY Right 7/10/2020    Procedure: TOTAL KNEE ARTHROPLASTY;  Surgeon: Rehan Fernández MD;  Location: [facility];  Service: Orthopedics;  Laterality: Right;     Family History:   Family History   Problem Relation Age of Onset   • Cancer Mother         lung   • Sleep apnea Other    • Lung cancer Other    • Malig Hyperthermia Neg Hx      Social History:   Social History     Tobacco Use   • Smoking status: Never Smoker   • Smokeless tobacco: Never Used    Substance Use Topics   • Alcohol use: Yes     Comment: VERY LITTLE   • Drug use: Never       Vital Signs Range for the last 24 hours  Temperature: Temp:  [36.4 °C (97.5 °F)] 36.4 °C (97.5 °F)   Temp Source: Temp src: Infrared   BP: BP: (129)/(84) 129/84   Pulse: Heart Rate:  [64] 64   Respirations: Resp:  [16] 16   SPO2: SpO2:  [97 %] 97 %   O2 Amount (l/min):     O2 Devices Device (Oxygen Therapy): room air   Weight:           --------------------------------------------------------------------------------    Current Outpatient Medications   Medication Sig Dispense Refill   • benazepril (LOTENSIN) 10 MG tablet Take 1 tablet by mouth Daily. 90 tablet 1   • HYDROcodone-acetaminophen (NORCO) 7.5-325 MG per tablet 1-2 po q 4-6 hr prn pain 60 tablet 0   • magnesium oxide (MAG-OX) 400 MG tablet Take 400 mg by mouth Daily.     • meloxicam (MOBIC) 7.5 MG tablet Take 1 tablet by mouth Daily. (Patient taking differently: Take 7.5 mg by mouth Daily. INSTRUCTED PT TO FOLLOW MD INSTRUCTIONS REGARDING HOLDING FOR SURGERY) 90 tablet 1   • multivitamin (MULTI VITAMIN MENS PO) Take  by mouth Daily.     • Omega-3 Fatty Acids (Fish Oil) 1000 MG capsule delayed-release Take 1,000 each by mouth Daily.     • pantoprazole (PROTONIX) 40 MG EC tablet Take 1 tablet by mouth Daily. 90 tablet 1   • simvastatin (ZOCOR) 40 MG tablet Take 1 tablet by mouth Every Night. (Patient taking differently: Take 40 mg by mouth Daily.) 90 tablet 1   • tamsulosin (FLOMAX) 0.4 MG capsule 24 hr capsule Take 1 capsule by mouth Daily.     • TURMERIC PO Take  by mouth.     • ASPIRIN 81 MG EC tablet TAKE 1 TABLET BY MOUTH EVERY 12 (TWELVE) HOURS FOR 60 DOSES. 60 tablet 0     No current facility-administered medications for this encounter.      Facility-Administered Medications Ordered in Other Encounters   Medication Dose Route Frequency Provider Last Rate Last Admin   • Chlorhexidine Gluconate 2 % pads 2 each  2 pad Apply externally BID Rehan Fernández MD            --------------------------------------------------------------------------------  Assessment/Plan      Anesthesia Evaluation     Patient summary reviewed and Nursing notes reviewed   NPO Solid Status: > 8 hours  NPO Liquid Status: > 2 hours           Airway   Mallampati: II  TM distance: >3 FB  Neck ROM: full  Dental - normal exam     Pulmonary - normal exam    breath sounds clear to auscultation  (+) sleep apnea,   Cardiovascular - normal exam    Rhythm: regular  Rate: normal    (+) hypertension, hyperlipidemia,   (-) angina, orthopnea, PND, SOLIMAN      Neuro/Psych- negative ROS  GI/Hepatic/Renal/Endo    (+) obesity,  GERD,      Musculoskeletal     (+) neck pain,   Abdominal  - normal exam    Abdomen: soft.  Bowel sounds: normal.   Substance History - negative use     OB/GYN negative ob/gyn ROS         Other   arthritis,                 Diagnosis and Plan    Treatment Plan  ASA 3   Patient has had previous injection/procedure with 10-25% improvement.   Procedures: Cervical Epidural Steroid Injection(JOSEPH), With fluoroscopy,       Anesthetic plan and risks discussed with patient.          Diagnosis     * Spinal stenosis, cervical region [M48.02]     * Cervical radiculopathy [M54.12]     * Degeneration of cervical intervertebral disc [M50.30]

## 2021-01-18 NOTE — ANESTHESIA PROCEDURE NOTES
PAIN Epidural block      Patient reassessed immediately prior to procedure    Patient location during procedure: pain clinic  Start Time: 1/18/2021 1:54 PM  Stop Time: 1/18/2021 2:09 PM  Indication:procedure for pain  Performed By  Anesthesiologist: Juan C Paul MD  Preanesthetic Checklist  Completed: patient identified, site marked, surgical consent, pre-op evaluation, timeout performed, risks and benefits discussed and monitors and equipment checked  Additional Notes  Post-Op Diagnosis Codes:     * Spinal stenosis, cervical region (M48.02)     * Cervical radiculopathy (M54.12)     * Degeneration of cervical intervertebral disc (M50.30)    The patient was observed in recovery with no evidence of neurological deficits or other problems. All questions were answered. The patient was discharged with appropriate instructions.  Prep:  Pt Position:prone  Sterile Tech:cap, gloves, mask and sterile barrier  Prep:chlorhexidine gluconate and isopropyl alcohol  Monitoring:blood pressure monitoring, continuous pulse oximetry and EKG  Procedure:Sedation: no     Approach:midline  Guidance: fluoroscopy  Location:cervical  Level:6-7 (Interlaminar)  Needle Type:Tuohy  Needle Gauge:20 G  Aspiration:negative  Medications:  Preservative Free Saline:3mL  Isovue:2mL  Comments:Isovue dye spread was consistent with epidural placement.  Preservative-free dexamethasone 10 mg  Post Assessment:  Dressing:occlusive dressing applied  Pt Tolerance:patient tolerated the procedure well with no apparent complications  Complications:no

## 2021-02-09 ENCOUNTER — HOSPITAL ENCOUNTER (OUTPATIENT)
Dept: GENERAL RADIOLOGY | Facility: HOSPITAL | Age: 71
Discharge: HOME | End: 2021-02-09

## 2021-02-09 ENCOUNTER — ANESTHESIA (OUTPATIENT)
Dept: PAIN MEDICINE | Facility: HOSPITAL | Age: 71
End: 2021-02-09

## 2021-02-09 ENCOUNTER — HOSPITAL ENCOUNTER (OUTPATIENT)
Dept: PAIN MEDICINE | Facility: HOSPITAL | Age: 71
Discharge: HOME | End: 2021-02-09

## 2021-02-09 ENCOUNTER — ANESTHESIA EVENT (OUTPATIENT)
Dept: PAIN MEDICINE | Facility: HOSPITAL | Age: 71
End: 2021-02-09

## 2021-02-09 VITALS
SYSTOLIC BLOOD PRESSURE: 137 MMHG | RESPIRATION RATE: 16 BRPM | HEART RATE: 66 BPM | OXYGEN SATURATION: 95 % | DIASTOLIC BLOOD PRESSURE: 75 MMHG

## 2021-02-09 DIAGNOSIS — M50.30 DEGENERATIVE, INTERVERTEBRAL DISC, CERVICAL: ICD-10-CM

## 2021-02-09 DIAGNOSIS — R52 PAIN: ICD-10-CM

## 2021-02-09 PROCEDURE — 25010000002 DEXAMETHASONE SODIUM PHOSPHATE 10 MG/ML SOLUTION: Performed by: ANESTHESIOLOGY

## 2021-02-09 PROCEDURE — 0 IOPAMIDOL 41 % SOLUTION: Performed by: ANESTHESIOLOGY

## 2021-02-09 PROCEDURE — 77003 FLUOROGUIDE FOR SPINE INJECT: CPT

## 2021-02-09 RX ORDER — DEXAMETHASONE SODIUM PHOSPHATE 10 MG/ML
10 INJECTION, SOLUTION INTRAMUSCULAR; INTRAVENOUS ONCE
Status: COMPLETED | OUTPATIENT
Start: 2021-02-09 | End: 2021-02-09

## 2021-02-09 RX ADMIN — DEXAMETHASONE SODIUM PHOSPHATE 10 MG: 10 INJECTION, SOLUTION INTRAMUSCULAR; INTRAVENOUS at 14:34

## 2021-02-09 RX ADMIN — IOPAMIDOL 10 ML: 408 INJECTION, SOLUTION INTRATHECAL at 14:33

## 2021-02-09 NOTE — ANESTHESIA PROCEDURE NOTES
PAIN Epidural block      Patient reassessed immediately prior to procedure    Patient location during procedure: pain clinic  Start Time: 2/9/2021 2:09 PM  Stop Time: 2/9/2021 2:36 PM  Indication:procedure for pain  Performed By  Anesthesiologist: Juan C Paul MD  Preanesthetic Checklist  Completed: patient identified, site marked, surgical consent, pre-op evaluation, timeout performed, risks and benefits discussed and monitors and equipment checked  Additional Notes  Post-Op Diagnosis Codes:     * Degeneration of cervical intervertebral disc (M50.30)     * Spinal stenosis, cervical region (M48.02)     * Cervical radiculopathy (M54.12)    The patient was observed in recovery with no evidence of neurological deficits or other problems. All questions were answered. The patient was discharged with appropriate instructions.  Prep:  Pt Position:prone  Sterile Tech:cap, gloves, mask and sterile barrier  Prep:chlorhexidine gluconate and isopropyl alcohol  Monitoring:blood pressure monitoring, continuous pulse oximetry and EKG  Procedure:Sedation: no     Approach:midline  Guidance: fluoroscopy  Location:cervical  Level:6-7 (Interlaminar)  Needle Type:Tuohy  Needle Gauge:20 G  Aspiration:negative  Medications:  Preservative Free Saline:3mL  Isovue:2mL  Comments:Isovue dye spread was consistent with epidural placement.  Preservative-free dexamethasone 10 mg  Post Assessment:  Dressing:occlusive dressing applied  Pt Tolerance:patient tolerated the procedure well with no apparent complications  Complications:no

## 2021-02-17 ENCOUNTER — OFFICE VISIT (OUTPATIENT)
Dept: ORTHOPEDIC SURGERY | Facility: CLINIC | Age: 71
End: 2021-02-17

## 2021-02-17 VITALS — HEIGHT: 72 IN | WEIGHT: 245 LBS | TEMPERATURE: 98.2 F | BODY MASS INDEX: 33.18 KG/M2

## 2021-02-17 DIAGNOSIS — M54.2 CERVICAL SPINE PAIN: ICD-10-CM

## 2021-02-17 DIAGNOSIS — M47.22 CERVICAL SPONDYLOSIS WITH RADICULOPATHY: Primary | ICD-10-CM

## 2021-02-17 PROCEDURE — 99213 OFFICE O/P EST LOW 20 MIN: CPT | Performed by: ORTHOPAEDIC SURGERY

## 2021-02-17 NOTE — PROGRESS NOTES
He is significantly better after epidural injections x3.  Good strength in the upper extremities bilaterally Radha test is negative.  He has multilevel stenosis from C3-C7 and a very lordotic posture at rest in the MRI not as good upright but I think he certainly has some control over this.  He may be a good candidate for kyphoplasty.  Right now he is doing too well for surgery in my opinion but I do want to go ahead and get him into see Samuel Gómez on a elective basis so that he can touch base and see if that might be the operation for him.  Otherwise I will see him back as needed.

## 2021-03-19 ENCOUNTER — BULK ORDERING (OUTPATIENT)
Dept: CASE MANAGEMENT | Facility: OTHER | Age: 71
End: 2021-03-19

## 2021-03-19 DIAGNOSIS — Z23 IMMUNIZATION DUE: ICD-10-CM

## 2021-07-19 ENCOUNTER — OFFICE VISIT (OUTPATIENT)
Dept: ORTHOPEDIC SURGERY | Facility: CLINIC | Age: 71
End: 2021-07-19

## 2021-07-19 VITALS — BODY MASS INDEX: 34.16 KG/M2 | HEIGHT: 72 IN | TEMPERATURE: 98.2 F | WEIGHT: 252.2 LBS

## 2021-07-19 DIAGNOSIS — M17.12 PRIMARY OSTEOARTHRITIS OF LEFT KNEE: ICD-10-CM

## 2021-07-19 DIAGNOSIS — R52 PAIN: Primary | ICD-10-CM

## 2021-07-19 PROCEDURE — 99214 OFFICE O/P EST MOD 30 MIN: CPT | Performed by: NURSE PRACTITIONER

## 2021-07-19 PROCEDURE — 20610 DRAIN/INJ JOINT/BURSA W/O US: CPT | Performed by: NURSE PRACTITIONER

## 2021-07-19 PROCEDURE — 73562 X-RAY EXAM OF KNEE 3: CPT | Performed by: NURSE PRACTITIONER

## 2021-07-19 RX ORDER — MELOXICAM 15 MG/1
15 TABLET ORAL ONCE
Status: CANCELLED | OUTPATIENT
Start: 2021-12-01 | End: 2021-07-19

## 2021-07-19 RX ORDER — CHLORHEXIDINE GLUCONATE 500 MG/1
CLOTH TOPICAL 2 TIMES DAILY
Status: CANCELLED | OUTPATIENT
Start: 2021-07-19

## 2021-07-19 RX ORDER — CEFAZOLIN SODIUM 2 G/100ML
2 INJECTION, SOLUTION INTRAVENOUS ONCE
Status: CANCELLED | OUTPATIENT
Start: 2021-12-01 | End: 2021-07-19

## 2021-07-19 RX ORDER — CEPHALEXIN 500 MG/1
CAPSULE ORAL
COMMUNITY
Start: 2021-06-14 | End: 2021-12-02 | Stop reason: HOSPADM

## 2021-07-19 RX ORDER — PREGABALIN 75 MG/1
150 CAPSULE ORAL ONCE
Status: CANCELLED | OUTPATIENT
Start: 2021-12-01 | End: 2021-07-19

## 2021-07-19 RX ADMIN — LIDOCAINE HYDROCHLORIDE 2 ML: 20 INJECTION, SOLUTION EPIDURAL; INFILTRATION; INTRACAUDAL; PERINEURAL at 11:12

## 2021-07-19 RX ADMIN — METHYLPREDNISOLONE ACETATE 80 MG: 80 INJECTION, SUSPENSION INTRA-ARTICULAR; INTRALESIONAL; INTRAMUSCULAR; SOFT TISSUE at 11:12

## 2021-07-19 NOTE — PROGRESS NOTES
Patient: Aldair Cordova  YOB: 1950 71 y.o. male  Medical Record Number: [id number]    Chief Complaints:   Chief Complaint   Patient presents with   • Right Knee - Follow-up       History of Present Illness:Aldair Cordova is a 71 y.o. male who presents with complaints of severe left knee pain.  He has known bone-on-bone end-stage osteoarthritis.  He has tried and failed all conservative measures and would like to proceed with surgery    Allergies:   Allergies   Allergen Reactions   • Xanax [Alprazolam] Other (See Comments)     Makes me mean       Medications:   Current Outpatient Medications   Medication Sig Dispense Refill   • ASPIRIN 81 MG EC tablet TAKE 1 TABLET BY MOUTH EVERY 12 (TWELVE) HOURS FOR 60 DOSES. 60 tablet 0   • benazepril (LOTENSIN) 10 MG tablet Take 1 tablet by mouth Daily. 90 tablet 1   • cephalexin (KEFLEX) 500 MG capsule TAKE 4 CAPSULES BY MOUTH 1 HOUR PRIOR TO DENTAL APPOINTMENT     • HYDROcodone-acetaminophen (NORCO) 7.5-325 MG per tablet 1-2 po q 4-6 hr prn pain 60 tablet 0   • magnesium oxide (MAG-OX) 400 MG tablet Take 400 mg by mouth Daily.     • meloxicam (MOBIC) 7.5 MG tablet Take 1 tablet by mouth Daily. (Patient taking differently: Take 7.5 mg by mouth Daily. INSTRUCTED PT TO FOLLOW MD INSTRUCTIONS REGARDING HOLDING FOR SURGERY) 90 tablet 1   • multivitamin (MULTI VITAMIN MENS PO) Take  by mouth Daily.     • Omega-3 Fatty Acids (Fish Oil) 1000 MG capsule delayed-release Take 1,000 each by mouth Daily.     • pantoprazole (PROTONIX) 40 MG EC tablet Take 1 tablet by mouth Daily. 90 tablet 1   • simvastatin (ZOCOR) 40 MG tablet Take 1 tablet by mouth Every Night. (Patient taking differently: Take 40 mg by mouth Daily.) 90 tablet 1   • tamsulosin (FLOMAX) 0.4 MG capsule 24 hr capsule Take 1 capsule by mouth Daily.     • TURMERIC PO Take  by mouth.       No current facility-administered medications for this visit.     Facility-Administered Medications Ordered in Other  "Visits   Medication Dose Route Frequency Provider Last Rate Last Admin   • Chlorhexidine Gluconate 2 % pads 2 each  2 pad Apply externally BID Rehan Fernández MD             The following portions of the patient's history were reviewed and updated as appropriate: allergies, current medications, past family history, past medical history, past social history, past surgical history and problem list.    Review of Systems:   A 14 point review of systems was performed. All systems negative except pertinent positives/negative listed in HPI above    Physical Exam:   Vitals:    07/19/21 1058   Temp: 98.2 °F (36.8 °C)   TempSrc: Temporal   Weight: 114 kg (252 lb 3.2 oz)   Height: 182.9 cm (72\")       General: A and O x 3, ASA, NAD    SCLERA:    Normal    Skin clear no unusual lesions noted  Right knee the patient is well-healed surgical incision noted 120 degrees flexion neutral extension with no instability calf is soft nontender  Left knee patient does have trace amount of effusion noted with 110 degrees flexion neutral extension with a positive Jaja negative Lockman calf soft and nontender         Radiology:  Xrays 3views (ap,lateral, sunrise) right knee was reviewed today secondary to previous surgery shows well-placed well-positioned right total knee replacement he has end-stage osteoarthritis of left knee.  Compared to views are unchanged    Assessment/Plan: Status post right TKA stable  End-stage osteoarthritis left knee    Patient I discussed options, he does get only temporary results from left knee injection so we will go ahead and proceed with that today however he would like to proceed with left total knee replacement.  He will continue with physical therapy exercises and we will see him back prior to surgery  Continuation of conservative management vs. TKA discussed.  The patient wishes to proceed with total knee replacement.  At this point the patient has failed the full compliment of conservative treatment " and stating complete understanding of the risks/benefits/ anternatives wishes to proceed with surgical treatment.    Risk and benefits of surgery were reviewed.  Including, but not limited to, blood clots or pulmonary embolism, anesthesia risk, infection, fracture, skin/leg numbness, persistent pain/crepitance/popping/catching, failure of the implant, need for future surgeries, hematoma, possible nerve or blood vessel injury, need for transfusion, and potential risk of stroke,heart attack or death, among others.  The patient understands and wishes to proceed.     It was explained that if tissue has been repaired or reconstructed, there is also an increased chance of failure which may require further management.  Following the completion of the discussion, the patient expressed understanding of this planned course of care, all their questions were answered and consent will be obtained preoperatively.    Operative Plan: Smith and Nephtremaine Oxinium Total Knee Replacement an overnight staywith home health rehab    Large Joint Arthrocentesis: L knee  Date/Time: 7/19/2021 11:12 AM  Consent given by: patient  Site marked: site marked  Timeout: Immediately prior to procedure a time out was called to verify the correct patient, procedure, equipment, support staff and site/side marked as required   Supporting Documentation  Indications: pain and joint swelling   Procedure Details  Location: knee - L knee  Preparation: Patient was prepped and draped in the usual sterile fashion  Needle size: 22 G  Approach: anterolateral  Medications administered: 80 mg methylPREDNISolone acetate 80 MG/ML; 2 mL lidocaine PF 2% 2 %  Patient tolerance: patient tolerated the procedure well with no immediate complications          Luciana Whitaker, APRN  7/19/2021

## 2021-07-20 RX ORDER — LIDOCAINE HYDROCHLORIDE 20 MG/ML
2 INJECTION, SOLUTION EPIDURAL; INFILTRATION; INTRACAUDAL; PERINEURAL
Status: COMPLETED | OUTPATIENT
Start: 2021-07-19 | End: 2021-07-19

## 2021-07-20 RX ORDER — METHYLPREDNISOLONE ACETATE 80 MG/ML
80 INJECTION, SUSPENSION INTRA-ARTICULAR; INTRALESIONAL; INTRAMUSCULAR; SOFT TISSUE
Status: COMPLETED | OUTPATIENT
Start: 2021-07-19 | End: 2021-07-19

## 2021-07-26 ENCOUNTER — OFFICE VISIT (OUTPATIENT)
Dept: SLEEP MEDICINE | Facility: HOSPITAL | Age: 71
End: 2021-07-26

## 2021-07-26 VITALS
WEIGHT: 250 LBS | HEIGHT: 72 IN | BODY MASS INDEX: 33.86 KG/M2 | OXYGEN SATURATION: 96 % | HEART RATE: 92 BPM | DIASTOLIC BLOOD PRESSURE: 87 MMHG | SYSTOLIC BLOOD PRESSURE: 154 MMHG

## 2021-07-26 DIAGNOSIS — G47.33 OBSTRUCTIVE SLEEP APNEA, ADULT: Primary | ICD-10-CM

## 2021-07-26 PROCEDURE — G0463 HOSPITAL OUTPT CLINIC VISIT: HCPCS

## 2021-07-26 NOTE — PROGRESS NOTES
[facility]                          Chief Complaint:   Follow up for obstructive sleep apnea, obesity, HTN.    History of present illness:   Subjective     KIEL:  He was initially diagnosed with KIEL in 2009. AHI= 24/h.      He's currently on CPAP on uses it regularly.  He reported benefit in term of daytime alertness and fatigue       Mask: FFM which does fit well.  No significant air leak or dry mouth  DME: [facility]    ESS: Total score: 4     HTN:  On benazepril and doxazosin.  Takes his meds regularly.  No side effects.  BP is usually less than 140-130 systolic.    REVIEW OF SYSTEMS:   HEENT: Nasal congestion but no postnasal drip   CARDIOVASCULAR: No chest pain, chest pressure or chest discomfort. No palpitations or edema.   RESPIRATORY: No shortness of breath, cough or sputum.   GASTROINTESTINAL: No abdominal bloating or reflux   NEUROLOGICAL/PSYCHOATRY: No depression nor anxiety    Past Medical History:  Past Medical History:   Diagnosis Date   • Anxiety 02/02/2016   • Benign essential hypertension    • BPH (benign prostatic hyperplasia)    • Degenerative, intervertebral disc, cervical 1/18/2021   • GERD (gastroesophageal reflux disease)    • History of kidney stones    • HLD (hyperlipidemia)    • Neck pain    • Nocturia 02/02/2016   • Obesity (BMI 30-39.9) 1/29/2018   • Osteoarthrosis, localized, primary, knee 12/15/2016   • Sleep apnea     uses cpap   • Sleep deprivation 1/29/2018   • Thrombocytopenia (CMS/HCC) 02/08/2016   ,   Past Surgical History:   Procedure Laterality Date   • COLONOSCOPY  10/21/2016   • CYST REMOVAL      OFF NECK AND HEAD   • EPIDURAL BLOCK     • EXTRACORPOREAL SHOCK WAVE LITHOTRIPSY (ESWL)     • KNEE ARTHROSCOPY Left    • TOTAL KNEE ARTHROPLASTY Right 7/10/2020    Procedure: TOTAL KNEE ARTHROPLASTY;  Surgeon: Rehan Fernández MD;  Location: [facility] OR;  Service: Orthopedics;  Laterality: Right;   ,   Social History     Socioeconomic History   •  Marital status:      Spouse name: Not on file   • Number of children: Not on file   • Years of education: Not on file   • Highest education level: Not on file   Tobacco Use   • Smoking status: Never Smoker   • Smokeless tobacco: Never Used   Vaping Use   • Vaping Use: Never used   Substance and Sexual Activity   • Alcohol use: Yes     Comment: VERY LITTLE   • Drug use: Never   • Sexual activity: Defer     E-cigarette/Vaping   • E-cigarette/Vaping Use Never User      E-cigarette/Vaping Substances   • Nicotine No    • THC No    • CBD No    • Flavoring No      E-cigarette/Vaping Devices   • Disposable No    • Pre-filled or Refillable Cartridge No    • Refillable Tank No    • Pre-filled Pod No         and Allergies:  Xanax [alprazolam]    Medication Review:     Current Outpatient Medications:   •  ASPIRIN 81 MG EC tablet, TAKE 1 TABLET BY MOUTH EVERY 12 (TWELVE) HOURS FOR 60 DOSES., Disp: 60 tablet, Rfl: 0  •  benazepril (LOTENSIN) 10 MG tablet, Take 1 tablet by mouth Daily., Disp: 90 tablet, Rfl: 1  •  cephalexin (KEFLEX) 500 MG capsule, TAKE 4 CAPSULES BY MOUTH 1 HOUR PRIOR TO DENTAL APPOINTMENT, Disp: , Rfl:   •  HYDROcodone-acetaminophen (NORCO) 7.5-325 MG per tablet, 1-2 po q 4-6 hr prn pain, Disp: 60 tablet, Rfl: 0  •  magnesium oxide (MAG-OX) 400 MG tablet, Take 400 mg by mouth Daily., Disp: , Rfl:   •  meloxicam (MOBIC) 7.5 MG tablet, Take 1 tablet by mouth Daily. (Patient taking differently: Take 7.5 mg by mouth Daily. INSTRUCTED PT TO FOLLOW MD INSTRUCTIONS REGARDING HOLDING FOR SURGERY), Disp: 90 tablet, Rfl: 1  •  multivitamin (MULTI VITAMIN MENS PO), Take  by mouth Daily., Disp: , Rfl:   •  Omega-3 Fatty Acids (Fish Oil) 1000 MG capsule delayed-release, Take 1,000 each by mouth Daily., Disp: , Rfl:   •  pantoprazole (PROTONIX) 40 MG EC tablet, Take 1 tablet by mouth Daily., Disp: 90 tablet, Rfl: 1  •  simvastatin (ZOCOR) 40 MG tablet, Take 1 tablet by mouth Every Night. (Patient taking differently:  "Take 40 mg by mouth Daily.), Disp: 90 tablet, Rfl: 1  •  tamsulosin (FLOMAX) 0.4 MG capsule 24 hr capsule, Take 1 capsule by mouth Daily., Disp: , Rfl:   •  TURMERIC PO, Take  by mouth., Disp: , Rfl:   No current facility-administered medications for this visit.    Facility-Administered Medications Ordered in Other Visits:   •  Chlorhexidine Gluconate 2 % pads 2 each, 2 pad, Apply externally, BID, Rehan Fernández MD      Objective   Vital Signs:  Vitals:    07/26/21 1300   BP: 154/87   Pulse: 92   SpO2: 96%   Weight: 113 kg (250 lb)   Height: 182.9 cm (72\")     Body mass index is 33.91 kg/m².          Physical Exam:   General Appearance:    Alert, cooperative, in no acute distress   ENMT:  Freidman score 3, narrow distance in between the posterior pharyngeal pillars.  Mild scalloping of the tongue   Neck:  Large. Trachea midline. No thyromegaly.   Lungs:     Clear to auscultation,respirations regular, even and                  unlabored    Heart:    Regular rhythm and normal rate, normal S1 and S2, no            Murmur.   Abdomen:     Obese.  Soft.  No tenderness.  No HSM    Neuro:   Conscious, alert, oriented x3. Appropriate mood and affect.    Extremities:   Moves all extremities well, no edema, no cyanosis, no             Redness              Diagnostic data:  NPSG split night: 2/4/09 at [facility] at a weight of 230 pounds. Moderate to severe KIEL with AHI of 24.4 that increased to 53.3 during REM sleep. 2.7% of TST spent < 90% saturation. Arousal index 20. Controlled on CPAP at 7 cmH20.    CPAP download showed:  Date: Last 30 days  Usage (days): 100%  Days used>4h: 93%  AHI: 3 /h  Leak: 0 min and 4 seconds  Usage: 6 h and 11 min   CPAP: 10 cm H2O    Labs 7/8/21:  Hb= 14.9; Bicar=27.    Assessment   1. KIEL, on CPAP 10  2. Obesity (BMI = 32)  3. Essential hypertension        PLAN:    Discussed the result of the download.   Patient is compliant with therapy and clinically benefit from treatment.  Patient " was encouraged to continue using his CPAP.  Refill supplies.    Discussed the issue with the recall on [employer] Respironics and the potential side effects and advised the patient to discontinue the humidifier, apply an antibacterial filter and register his machine and call his DME to see if he can get a different CPAP machine.    Discussed the association between obstructive sleep apnea and cardiovascular disease including HTN and the beneficial effect of Pap therapy in reducing the risk of major cardiovascular events.  Continue current meds.  Per his reports, blood pressure is usually regulated at home but currently high perhaps due to rushing into the clinic.                This note was dictated utilizing [employer] dictation

## 2021-11-12 NOTE — H&P
Patient: Aldair Cordova    Date of Admission: 7/10/2020    YOB: 1950    Medical Record Number: [id number]    Admitting Physician: Dr. Rehan Fernández    Reason for Admission: End Stage Right Knee OA    History of Present Illness: 70 y.o. male presents with severe end stage knee osteoarthritis which has not been responsive to the full compliment of conservative measures. Despite conservative attempts, there is still severe, constant activity limiting pain. Given the severity of the pain, the patient has elected to proceed with knee replacement.    Allergies:   Allergies   Allergen Reactions   • Xanax [Alprazolam] Other (See Comments)     Makes me mean         Current Medications:  Home Medications:    Current Outpatient Medications on File Prior to Visit   Medication Sig   • aspirin 81 MG tablet Take by mouth.   • benazepril (LOTENSIN) 10 MG tablet Take 1 tablet by mouth Daily.   • doxazosin (CARDURA) 4 MG tablet Take 1 tablet by mouth Daily.   • Magnesium 250 MG tablet Take 400 mg by mouth.   • meloxicam (MOBIC) 7.5 MG tablet Take 1 tablet by mouth Daily.   • Multiple Vitamins-Minerals (MULTI COMPLETE) capsule Take by mouth daily.   • Omega-3 Fatty Acids (EQL FISH OIL) 1000 MG capsule Take by mouth daily.   • pantoprazole (PROTONIX) 40 MG EC tablet Take 1 tablet by mouth Daily.   • simvastatin (ZOCOR) 40 MG tablet TAKE 1 TABLET DAILY PLEASE MAKE AN APPOINTMENT WITH   YOUR DOCTOR   • simvastatin (ZOCOR) 40 MG tablet Take 1 tablet by mouth Every Night.   • tamsulosin (FLOMAX) 0.4 MG capsule 24 hr capsule    • TURMERIC PO Take 538 mg by mouth.     Current Facility-Administered Medications on File Prior to Visit   Medication   • hylan (SYNVISC ONE) injection 48 mg     PRN Meds:.    PMH:     Past Medical History:   Diagnosis Date   • Acute pain of left knee 11/7/2016   • Anxiety 2/2/2016   • Benign essential hypertension    • GERD (gastroesophageal reflux disease)    • HLD (hyperlipidemia)    • Nocturia  "2/2/2016   • Obesity (BMI 30-39.9) 1/29/2018   • Osteoarthrosis, localized, primary, knee 12/15/2016   • Sleep deprivation 1/29/2018   • Thrombocytopenia (CMS/HCC) 2/8/2016       PF/Surg/Soc Hx:     Past Surgical History:   Procedure Laterality Date   • COLONOSCOPY  10/21/2016   • KIDNEY STONE SURGERY     • KNEE SURGERY          Social History     Occupational History   • Occupation: fork    Tobacco Use   • Smoking status: Never Smoker   • Smokeless tobacco: Never Used   Substance and Sexual Activity   • Alcohol use: No   • Drug use: No     Comment: drinks caffeine on occasion   • Sexual activity: Defer      Social History     Social History Narrative   • Not on file        Family History   Problem Relation Age of Onset   • Cancer Mother         lung   • Sleep apnea Other    • Lung cancer Other          Review of Systems:   A 14 point review of systems was performed, pertinent positives discussed above, all other systems are negative    Physical Exam: 70 y.o. male  Vital Signs :   Vitals:    07/02/20 1540   Temp: 97.6 °F (36.4 °C)   Weight: 106 kg (234 lb)   Height: 182.9 cm (72\")   PainSc:   6     General: Alert and Oriented x 3, No acute distress.  Psych: mood and affect appropriate; recent and remote memory intact  Eyes: conjunctiva clear; pupils equally round and reactive, sclera nonicteric  CV: no peripheral edema  Resp: normal respiratory effort  Skin: no rashes or wounds; normal turgor  Musculosketetal; pain and crepitance with knee range of motion  Vascular: palpable distal pulses    Xrays:  -3 views (AP, lateral, and sunrise) were reviewed demonstrating end-stage OA with bone on bone articulation.    Assessment:  End-stage Right knee osteoarthritis. Conservative measures have failed.      Plan:  The plan is to proceed with Right Total Knee Replacement. The patient voiced understanding of the risks, benefits, and alternative forms of treatment that were discussed with Dr Fernández at the time of " scheduling.  Patient's plan on going home with home health next day    Luciana Whitaker, APRN  7/2/2020         EKG; Heart Rate: 73/min, Normal sinus rhythm, Left Axis Deviation, LVH

## 2021-11-15 ENCOUNTER — PRE-ADMISSION TESTING (OUTPATIENT)
Dept: PREADMISSION TESTING | Facility: HOSPITAL | Age: 71
End: 2021-11-15

## 2021-11-15 VITALS
SYSTOLIC BLOOD PRESSURE: 154 MMHG | HEART RATE: 85 BPM | OXYGEN SATURATION: 97 % | HEIGHT: 72 IN | WEIGHT: 259 LBS | DIASTOLIC BLOOD PRESSURE: 82 MMHG | TEMPERATURE: 97.8 F | BODY MASS INDEX: 35.08 KG/M2 | RESPIRATION RATE: 20 BRPM

## 2021-11-15 DIAGNOSIS — M17.12 PRIMARY OSTEOARTHRITIS OF LEFT KNEE: ICD-10-CM

## 2021-11-15 LAB
ANION GAP SERPL CALCULATED.3IONS-SCNC: 10.4 MMOL/L (ref 5–15)
BILIRUB UR QL STRIP: NEGATIVE
BUN SERPL-MCNC: 14 MG/DL (ref 8–23)
BUN/CREAT SERPL: 11.9 (ref 7–25)
CALCIUM SPEC-SCNC: 10.2 MG/DL (ref 8.6–10.5)
CHLORIDE SERPL-SCNC: 104 MMOL/L (ref 98–107)
CLARITY UR: CLEAR
CO2 SERPL-SCNC: 25.6 MMOL/L (ref 22–29)
COLOR UR: YELLOW
CREAT SERPL-MCNC: 1.18 MG/DL (ref 0.76–1.27)
DEPRECATED RDW RBC AUTO: 37.7 FL (ref 37–54)
ERYTHROCYTE [DISTWIDTH] IN BLOOD BY AUTOMATED COUNT: 12.2 % (ref 12.3–15.4)
GFR SERPL CREATININE-BSD FRML MDRD: 61 ML/MIN/1.73
GLUCOSE SERPL-MCNC: 104 MG/DL (ref 65–99)
GLUCOSE UR STRIP-MCNC: NEGATIVE MG/DL
HCT VFR BLD AUTO: 45.7 % (ref 37.5–51)
HGB BLD-MCNC: 16.1 G/DL (ref 13–17.7)
HGB UR QL STRIP.AUTO: NEGATIVE
KETONES UR QL STRIP: NEGATIVE
LEUKOCYTE ESTERASE UR QL STRIP.AUTO: NEGATIVE
MCH RBC QN AUTO: 30 PG (ref 26.6–33)
MCHC RBC AUTO-ENTMCNC: 35.2 G/DL (ref 31.5–35.7)
MCV RBC AUTO: 85.3 FL (ref 79–97)
NITRITE UR QL STRIP: NEGATIVE
PH UR STRIP.AUTO: <=5 [PH] (ref 5–8)
PLATELET # BLD AUTO: 144 10*3/MM3 (ref 140–450)
PMV BLD AUTO: 10.7 FL (ref 6–12)
POTASSIUM SERPL-SCNC: 4.1 MMOL/L (ref 3.5–5.2)
PROT UR QL STRIP: NEGATIVE
QT INTERVAL: 407 MS
RBC # BLD AUTO: 5.36 10*6/MM3 (ref 4.14–5.8)
SODIUM SERPL-SCNC: 140 MMOL/L (ref 136–145)
SP GR UR STRIP: 1.01 (ref 1–1.03)
UROBILINOGEN UR QL STRIP: NORMAL
WBC # BLD AUTO: 6.29 10*3/MM3 (ref 3.4–10.8)

## 2021-11-15 PROCEDURE — 93010 ELECTROCARDIOGRAM REPORT: CPT | Performed by: INTERNAL MEDICINE

## 2021-11-15 PROCEDURE — 80048 BASIC METABOLIC PNL TOTAL CA: CPT

## 2021-11-15 PROCEDURE — 36415 COLL VENOUS BLD VENIPUNCTURE: CPT

## 2021-11-15 PROCEDURE — 93005 ELECTROCARDIOGRAM TRACING: CPT

## 2021-11-15 PROCEDURE — 85027 COMPLETE CBC AUTOMATED: CPT

## 2021-11-15 PROCEDURE — 81003 URINALYSIS AUTO W/O SCOPE: CPT

## 2021-11-15 RX ORDER — CHLORHEXIDINE GLUCONATE 500 MG/1
CLOTH TOPICAL 2 TIMES DAILY
Status: ACTIVE | OUTPATIENT
Start: 2021-11-15

## 2021-11-15 RX ORDER — CHLORHEXIDINE GLUCONATE 500 MG/1
1 CLOTH TOPICAL TAKE AS DIRECTED
COMMUNITY
End: 2021-12-02 | Stop reason: HOSPADM

## 2021-11-15 ASSESSMENT — KOOS JR
KOOS JR SCORE: 50.012
KOOS JR SCORE: 15

## 2021-11-15 NOTE — DISCHARGE INSTRUCTIONS

## 2021-11-18 ENCOUNTER — OFFICE VISIT (OUTPATIENT)
Dept: ORTHOPEDIC SURGERY | Facility: CLINIC | Age: 71
End: 2021-11-18

## 2021-11-18 VITALS — TEMPERATURE: 96.1 F | BODY MASS INDEX: 35.08 KG/M2 | WEIGHT: 259 LBS | HEIGHT: 72 IN

## 2021-11-18 DIAGNOSIS — R52 PAIN: Primary | ICD-10-CM

## 2021-11-18 PROCEDURE — 77077 JOINT SURVEY SINGLE VIEW: CPT | Performed by: ORTHOPAEDIC SURGERY

## 2021-11-18 PROCEDURE — 73562 X-RAY EXAM OF KNEE 3: CPT | Performed by: NURSE PRACTITIONER

## 2021-11-18 PROCEDURE — S0260 H&P FOR SURGERY: HCPCS | Performed by: NURSE PRACTITIONER

## 2021-11-18 NOTE — H&P
Patient: Aldair Cordova    Date of Admission: 12/1/2021    YOB: 1950    Medical Record Number: [id number]    Admitting Physician: Dr. Rehan Fernández    Reason for Admission: End Stage Left Knee OA    History of Present Illness: 71 y.o. male presents with severe end stage knee osteoarthritis which has not been responsive to the full compliment of conservative measures. Despite conservative attempts, there is still severe, constant activity limiting pain. Given the severity of the pain, the patient has elected to proceed with knee replacement.    Allergies:   Allergies   Allergen Reactions   • Xanax [Alprazolam] Other (See Comments)     Makes me mean         Current Medications:  Home Medications:    Current Outpatient Medications on File Prior to Visit   Medication Sig   • ASPIRIN 81 MG EC tablet TAKE 1 TABLET BY MOUTH EVERY 12 (TWELVE) HOURS FOR 60 DOSES. (Patient taking differently: Take 81 mg by mouth Daily. TO HOLD ONE WEEK PRIOR TO OR)   • benazepril (LOTENSIN) 10 MG tablet Take 1 tablet by mouth Daily.   • Chlorhexidine Gluconate Cloth 2 % pads Apply  topically. PRIOR TO OR   • magnesium oxide (MAG-OX) 400 MG tablet Take 400 mg by mouth Daily.   • meloxicam (MOBIC) 7.5 MG tablet Take 1 tablet by mouth Daily. (Patient taking differently: Take 7.5 mg by mouth Daily. TO HOLD 1 WEEK PRIOR TO OR)   • multivitamin (MULTI VITAMIN MENS PO) Take 1 tablet by mouth Daily. TO HOLD ONE WEEK PRIOR TO OR   • mupirocin (BACTROBAN) 2 % nasal ointment into the nostril(s) as directed by provider 2 (Two) Times a Day. PRIOR TO OR   • Omega-3 Fatty Acids (Fish Oil) 1000 MG capsule delayed-release Take 1,000 each by mouth Daily. TO HOLD ONE WEEK PRIOR TO OR   • pantoprazole (PROTONIX) 40 MG EC tablet Take 1 tablet by mouth Daily.   • simvastatin (ZOCOR) 40 MG tablet Take 1 tablet by mouth Every Night. (Patient taking differently: Take 40 mg by mouth Daily.)   • tamsulosin (FLOMAX) 0.4 MG capsule 24 hr capsule Take 1  capsule by mouth Daily.   • TURMERIC PO Take 538 mg by mouth Daily. TO HOLD ONE WEEK PRIOR TO OR   • cephalexin (KEFLEX) 500 MG capsule TAKE 4 CAPSULES BY MOUTH 1 HOUR PRIOR TO DENTAL APPOINTMENT     Current Facility-Administered Medications on File Prior to Visit   Medication   • Chlorhexidine Gluconate 2 % pads 2 each   • Chlorhexidine Gluconate Cloth 2 % pads     PRN Meds:.    PMH:     Past Medical History:   Diagnosis Date   • Anxiety    • Benign essential hypertension    • BPH (benign prostatic hyperplasia)    • Degenerative, intervertebral disc, cervical    • GERD (gastroesophageal reflux disease)    • History of kidney stones    • HLD (hyperlipidemia)    • Nocturia    • Osteoarthrosis, localized, primary, knee    • Sleep apnea     uses cpap       PF/Surg/Soc Hx:     Past Surgical History:   Procedure Laterality Date   • COLONOSCOPY  10/21/2016   • CYST REMOVAL      OFF NECK AND HEAD   • EPIDURAL BLOCK     • EXTRACORPOREAL SHOCK WAVE LITHOTRIPSY (ESWL)     • KNEE ARTHROSCOPY Left    • TOTAL KNEE ARTHROPLASTY Right 7/10/2020    Procedure: TOTAL KNEE ARTHROPLASTY;  Surgeon: Rehan Fernández MD;  Location: [facility];  Service: Orthopedics;  Laterality: Right;        Social History     Occupational History   • Occupation: fork    Tobacco Use   • Smoking status: Never Smoker   • Smokeless tobacco: Never Used   Vaping Use   • Vaping Use: Never used   Substance and Sexual Activity   • Alcohol use: Yes     Comment: VERY LITTLE   • Drug use: Never   • Sexual activity: Defer      Social History     Social History Narrative   • Not on file        Family History   Problem Relation Age of Onset   • Cancer Mother         lung   • Sleep apnea Other    • Lung cancer Other    • Malig Hyperthermia Neg Hx          Review of Systems:   A 14 point review of systems was performed, pertinent positives discussed above, all other systems are negative    Physical Exam: 71 y.o. male  Vital Signs :   Vitals:    11/18/21  "1318   Temp: 96.1 °F (35.6 °C)   Weight: 117 kg (259 lb)   Height: 182.9 cm (72\")     General: Alert and Oriented x 3, No acute distress.  Psych: mood and affect appropriate; recent and remote memory intact  Eyes: conjunctiva clear; pupils equally round and reactive, sclera nonicteric  CV: no peripheral edema  Resp: normal respiratory effort  Skin: no rashes or wounds; normal turgor  Musculosketetal; pain and crepitance with knee range of motion  Vascular: palpable distal pulses    Xrays:  -3 views (AP, lateral, and sunrise) were reviewed demonstrating end-stage OA with bone on bone articulation.  -A full length AP xray was ordered and reviewed today for purposes of operative alignment demonstrating end stage arthritic findings. There are no previous full length films for review    Assessment:  End-stage Left knee osteoarthritis. Conservative measures have failed.      Plan:  The plan is to proceed with Left Total Knee Replacement. The patient voiced understanding of the risks, benefits, and alternative forms of treatment that were discussed with Dr Fernández at the time of scheduling. 23     Luciana Whitaker, APRN  11/18/2021        "

## 2021-11-30 ENCOUNTER — LAB (OUTPATIENT)
Dept: LAB | Facility: HOSPITAL | Age: 71
End: 2021-11-30

## 2021-11-30 LAB — SARS-COV-2 ORF1AB RESP QL NAA+PROBE: NOT DETECTED

## 2021-11-30 PROCEDURE — U0004 COV-19 TEST NON-CDC HGH THRU: HCPCS | Performed by: NURSE PRACTITIONER

## 2021-11-30 PROCEDURE — C9803 HOPD COVID-19 SPEC COLLECT: HCPCS | Performed by: NURSE PRACTITIONER

## 2021-11-30 PROCEDURE — U0005 INFEC AGEN DETEC AMPLI PROBE: HCPCS | Performed by: NURSE PRACTITIONER

## 2021-12-01 ENCOUNTER — APPOINTMENT (OUTPATIENT)
Dept: GENERAL RADIOLOGY | Facility: HOSPITAL | Age: 71
End: 2021-12-01

## 2021-12-01 ENCOUNTER — ANESTHESIA (OUTPATIENT)
Dept: PERIOP | Facility: HOSPITAL | Age: 71
End: 2021-12-01

## 2021-12-01 ENCOUNTER — HOSPITAL ENCOUNTER (OUTPATIENT)
Facility: HOSPITAL | Age: 71
Discharge: HOME HEALTH | End: 2021-12-02
Attending: ORTHOPAEDIC SURGERY | Admitting: ORTHOPAEDIC SURGERY

## 2021-12-01 ENCOUNTER — ANESTHESIA EVENT (OUTPATIENT)
Dept: PERIOP | Facility: HOSPITAL | Age: 71
End: 2021-12-01

## 2021-12-01 DIAGNOSIS — Z96.652 S/P TKR (TOTAL KNEE REPLACEMENT), LEFT: Primary | ICD-10-CM

## 2021-12-01 DIAGNOSIS — M17.12 PRIMARY OSTEOARTHRITIS OF LEFT KNEE: ICD-10-CM

## 2021-12-01 PROCEDURE — C9290 INJ, BUPIVACAINE LIPOSOME: HCPCS | Performed by: ORTHOPAEDIC SURGERY

## 2021-12-01 PROCEDURE — 25010000002 HYDROMORPHONE PER 4 MG: Performed by: NURSE ANESTHETIST, CERTIFIED REGISTERED

## 2021-12-01 PROCEDURE — 25010000002 DEXAMETHASONE PER 1 MG: Performed by: NURSE ANESTHETIST, CERTIFIED REGISTERED

## 2021-12-01 PROCEDURE — 0 BUPIVACAINE LIPOSOME 1.3 % SUSPENSION 20 ML VIAL: Performed by: ORTHOPAEDIC SURGERY

## 2021-12-01 PROCEDURE — 76942 ECHO GUIDE FOR BIOPSY: CPT | Performed by: ORTHOPAEDIC SURGERY

## 2021-12-01 PROCEDURE — 97110 THERAPEUTIC EXERCISES: CPT

## 2021-12-01 PROCEDURE — C1713 ANCHOR/SCREW BN/BN,TIS/BN: HCPCS | Performed by: ORTHOPAEDIC SURGERY

## 2021-12-01 PROCEDURE — 25010000002 SUCCINYLCHOLINE PER 20 MG: Performed by: NURSE ANESTHETIST, CERTIFIED REGISTERED

## 2021-12-01 PROCEDURE — 25010000002 LIDOCAINE PER 10 MG: Performed by: NURSE ANESTHETIST, CERTIFIED REGISTERED

## 2021-12-01 PROCEDURE — C1889 IMPLANT/INSERT DEVICE, NOC: HCPCS | Performed by: ORTHOPAEDIC SURGERY

## 2021-12-01 PROCEDURE — 25010000002 ONDANSETRON PER 1 MG: Performed by: NURSE ANESTHETIST, CERTIFIED REGISTERED

## 2021-12-01 PROCEDURE — C1776 JOINT DEVICE (IMPLANTABLE): HCPCS | Performed by: ORTHOPAEDIC SURGERY

## 2021-12-01 PROCEDURE — 73560 X-RAY EXAM OF KNEE 1 OR 2: CPT

## 2021-12-01 PROCEDURE — G0378 HOSPITAL OBSERVATION PER HR: HCPCS

## 2021-12-01 PROCEDURE — 25010000002 MIDAZOLAM PER 1 MG: Performed by: ANESTHESIOLOGY

## 2021-12-01 PROCEDURE — 25010000002 ROPIVACAINE PER 1 MG: Performed by: ANESTHESIOLOGY

## 2021-12-01 PROCEDURE — 25010000002 FENTANYL CITRATE (PF) 50 MCG/ML SOLUTION: Performed by: ANESTHESIOLOGY

## 2021-12-01 PROCEDURE — 25010000002 FENTANYL CITRATE (PF) 50 MCG/ML SOLUTION: Performed by: NURSE ANESTHETIST, CERTIFIED REGISTERED

## 2021-12-01 PROCEDURE — 27447 TOTAL KNEE ARTHROPLASTY: CPT | Performed by: ORTHOPAEDIC SURGERY

## 2021-12-01 PROCEDURE — 25010000002 NEOSTIGMINE 5 MG/10ML SOLUTION: Performed by: NURSE ANESTHETIST, CERTIFIED REGISTERED

## 2021-12-01 PROCEDURE — 0 CEFAZOLIN IN DEXTROSE 2-4 GM/100ML-% SOLUTION: Performed by: NURSE PRACTITIONER

## 2021-12-01 PROCEDURE — 25010000002 VANCOMYCIN 10 G RECONSTITUTED SOLUTION: Performed by: NURSE PRACTITIONER

## 2021-12-01 PROCEDURE — 25010000002 PROPOFOL 10 MG/ML EMULSION: Performed by: NURSE ANESTHETIST, CERTIFIED REGISTERED

## 2021-12-01 PROCEDURE — 97162 PT EVAL MOD COMPLEX 30 MIN: CPT

## 2021-12-01 PROCEDURE — 25010000002 MAGNESIUM SULFATE PER 500 MG OF MAGNESIUM: Performed by: NURSE ANESTHETIST, CERTIFIED REGISTERED

## 2021-12-01 PROCEDURE — 27447 TOTAL KNEE ARTHROPLASTY: CPT | Performed by: NURSE PRACTITIONER

## 2021-12-01 DEVICE — VIOLET ANTIBACTERIAL POLYDIOXANONE, KNOTLESS TISSUE CONTROL DEVICE
Type: IMPLANTABLE DEVICE | Site: KNEE | Status: FUNCTIONAL
Brand: STRATAFIX

## 2021-12-01 DEVICE — LEGION CRUCIATE RETAINING OXINIUM                                    FEMORAL SIZE 6 LEFT
Type: IMPLANTABLE DEVICE | Site: KNEE | Status: FUNCTIONAL
Brand: LEGION

## 2021-12-01 DEVICE — IMPLANTABLE DEVICE: Type: IMPLANTABLE DEVICE | Site: KNEE | Status: FUNCTIONAL

## 2021-12-01 DEVICE — GENESIS II NON-POROUS TIBIAL                                    BASEPLATE SIZE 7 LEFT
Type: IMPLANTABLE DEVICE | Site: KNEE | Status: FUNCTIONAL
Brand: GENESIS II

## 2021-12-01 DEVICE — GENESIS II BICONVEX PATELLA 29MM
Type: IMPLANTABLE DEVICE | Site: KNEE | Status: FUNCTIONAL
Brand: GENESIS II

## 2021-12-01 DEVICE — KNOTLESS TISSUE CONTROL DEVICE, UNDYED UNIDIRECTIONAL (ANTIBACTERIAL) SYNTHETIC ABSORBABLE DEVICE
Type: IMPLANTABLE DEVICE | Site: KNEE | Status: FUNCTIONAL
Brand: STRATAFIX

## 2021-12-01 DEVICE — PALACOS® R IS A FAST-CURING, RADIOPAQUE, POLY(METHYL METHACRYLATE)-BASED BONE CEMENT.PALACOS ® R CONTAINS THE X-RAY CONTRAST MEDIUM ZIRCONIUM DIOXIDE. TO IMPROVE VISIBILITY IN THE SURGICAL FIELD PALACOS ® R HAS BEEN COLOURED WITH CHLOROPHYLL (E141). THE BONE CEMENT IS PREPARED DIRECTLY BEFORE USE BY MIXING A POLYMER POWDER COMPONENT WITH A LIQUID MONOMER COMPONENT. A DUCTILE DOUGH FORMS WHICH CURES WITHIN A FEW MINUTES.
Type: IMPLANTABLE DEVICE | Site: KNEE | Status: FUNCTIONAL
Brand: PALACOS®

## 2021-12-01 DEVICE — LEGION CRUCIATE RETAINING HIGH                                    FLEX HIGHLY CROSS LINKED                                    POLYETHYLENE SIZE 7-8 11MM
Type: IMPLANTABLE DEVICE | Site: KNEE | Status: FUNCTIONAL
Brand: LEGION

## 2021-12-01 RX ORDER — LIDOCAINE HYDROCHLORIDE 20 MG/ML
INJECTION, SOLUTION INFILTRATION; PERINEURAL AS NEEDED
Status: DISCONTINUED | OUTPATIENT
Start: 2021-12-01 | End: 2021-12-01 | Stop reason: SURG

## 2021-12-01 RX ORDER — POLYETHYLENE GLYCOL 3350 17 G/17G
17 POWDER, FOR SOLUTION ORAL 2 TIMES DAILY
Qty: 510 G | Refills: 0 | Status: SHIPPED | OUTPATIENT
Start: 2021-12-01 | End: 2021-12-17

## 2021-12-01 RX ORDER — ASPIRIN 81 MG/1
TABLET ORAL
Qty: 56 TABLET | Refills: 0 | Status: SHIPPED | OUTPATIENT
Start: 2021-12-02 | End: 2022-01-13

## 2021-12-01 RX ORDER — NALOXONE HCL 0.4 MG/ML
0.2 VIAL (ML) INJECTION AS NEEDED
Status: DISCONTINUED | OUTPATIENT
Start: 2021-12-01 | End: 2021-12-01 | Stop reason: HOSPADM

## 2021-12-01 RX ORDER — ALBUTEROL SULFATE 2.5 MG/3ML
2.5 SOLUTION RESPIRATORY (INHALATION) ONCE AS NEEDED
Status: DISCONTINUED | OUTPATIENT
Start: 2021-12-01 | End: 2021-12-01 | Stop reason: HOSPADM

## 2021-12-01 RX ORDER — LISINOPRIL 10 MG/1
10 TABLET ORAL
Status: DISCONTINUED | OUTPATIENT
Start: 2021-12-01 | End: 2021-12-02 | Stop reason: HOSPADM

## 2021-12-01 RX ORDER — FAMOTIDINE 10 MG/ML
20 INJECTION, SOLUTION INTRAVENOUS ONCE
Status: COMPLETED | OUTPATIENT
Start: 2021-12-01 | End: 2021-12-01

## 2021-12-01 RX ORDER — PREGABALIN 75 MG/1
150 CAPSULE ORAL ONCE
Status: COMPLETED | OUTPATIENT
Start: 2021-12-01 | End: 2021-12-01

## 2021-12-01 RX ORDER — LIDOCAINE HYDROCHLORIDE AND EPINEPHRINE BITARTRATE 20; .01 MG/ML; MG/ML
INJECTION, SOLUTION SUBCUTANEOUS
Status: COMPLETED | OUTPATIENT
Start: 2021-12-01 | End: 2021-12-01

## 2021-12-01 RX ORDER — ONDANSETRON 4 MG/1
4 TABLET, FILM COATED ORAL EVERY 8 HOURS PRN
Qty: 10 TABLET | Refills: 0 | Status: SHIPPED | OUTPATIENT
Start: 2021-12-01

## 2021-12-01 RX ORDER — MIDAZOLAM HYDROCHLORIDE 1 MG/ML
INJECTION INTRAMUSCULAR; INTRAVENOUS
Status: COMPLETED | OUTPATIENT
Start: 2021-12-01 | End: 2021-12-01

## 2021-12-01 RX ORDER — PROMETHAZINE HYDROCHLORIDE 25 MG/1
25 TABLET ORAL ONCE AS NEEDED
Status: DISCONTINUED | OUTPATIENT
Start: 2021-12-01 | End: 2021-12-01 | Stop reason: HOSPADM

## 2021-12-01 RX ORDER — DIPHENHYDRAMINE HYDROCHLORIDE 50 MG/ML
12.5 INJECTION INTRAMUSCULAR; INTRAVENOUS
Status: DISCONTINUED | OUTPATIENT
Start: 2021-12-01 | End: 2021-12-01 | Stop reason: HOSPADM

## 2021-12-01 RX ORDER — PROPOFOL 10 MG/ML
VIAL (ML) INTRAVENOUS AS NEEDED
Status: DISCONTINUED | OUTPATIENT
Start: 2021-12-01 | End: 2021-12-01

## 2021-12-01 RX ORDER — IBUPROFEN 600 MG/1
600 TABLET ORAL ONCE AS NEEDED
Status: DISCONTINUED | OUTPATIENT
Start: 2021-12-01 | End: 2021-12-01 | Stop reason: HOSPADM

## 2021-12-01 RX ORDER — HYDRALAZINE HYDROCHLORIDE 20 MG/ML
5 INJECTION INTRAMUSCULAR; INTRAVENOUS
Status: DISCONTINUED | OUTPATIENT
Start: 2021-12-01 | End: 2021-12-01 | Stop reason: HOSPADM

## 2021-12-01 RX ORDER — ROCURONIUM BROMIDE 10 MG/ML
INJECTION, SOLUTION INTRAVENOUS AS NEEDED
Status: DISCONTINUED | OUTPATIENT
Start: 2021-12-01 | End: 2021-12-01 | Stop reason: SURG

## 2021-12-01 RX ORDER — SODIUM CHLORIDE 0.9 % (FLUSH) 0.9 %
10 SYRINGE (ML) INJECTION EVERY 12 HOURS SCHEDULED
Status: DISCONTINUED | OUTPATIENT
Start: 2021-12-01 | End: 2021-12-01 | Stop reason: HOSPADM

## 2021-12-01 RX ORDER — NEOSTIGMINE METHYLSULFATE 0.5 MG/ML
INJECTION, SOLUTION INTRAVENOUS AS NEEDED
Status: DISCONTINUED | OUTPATIENT
Start: 2021-12-01 | End: 2021-12-01 | Stop reason: SURG

## 2021-12-01 RX ORDER — PANTOPRAZOLE SODIUM 40 MG/1
40 TABLET, DELAYED RELEASE ORAL DAILY
Status: DISCONTINUED | OUTPATIENT
Start: 2021-12-02 | End: 2021-12-02 | Stop reason: HOSPADM

## 2021-12-01 RX ORDER — DIPHENHYDRAMINE HCL 25 MG
25 CAPSULE ORAL
Status: DISCONTINUED | OUTPATIENT
Start: 2021-12-01 | End: 2021-12-01 | Stop reason: HOSPADM

## 2021-12-01 RX ORDER — HYDROCODONE BITARTRATE AND ACETAMINOPHEN 7.5; 325 MG/1; MG/1
1 TABLET ORAL ONCE AS NEEDED
Status: DISCONTINUED | OUTPATIENT
Start: 2021-12-01 | End: 2021-12-01 | Stop reason: HOSPADM

## 2021-12-01 RX ORDER — SUCCINYLCHOLINE CHLORIDE 20 MG/ML
INJECTION INTRAMUSCULAR; INTRAVENOUS AS NEEDED
Status: DISCONTINUED | OUTPATIENT
Start: 2021-12-01 | End: 2021-12-01 | Stop reason: SURG

## 2021-12-01 RX ORDER — MAGNESIUM SULFATE HEPTAHYDRATE 500 MG/ML
INJECTION, SOLUTION INTRAMUSCULAR; INTRAVENOUS AS NEEDED
Status: DISCONTINUED | OUTPATIENT
Start: 2021-12-01 | End: 2021-12-01 | Stop reason: SURG

## 2021-12-01 RX ORDER — PANTOPRAZOLE SODIUM 40 MG/1
40 TABLET, DELAYED RELEASE ORAL DAILY
Status: DISCONTINUED | OUTPATIENT
Start: 2021-12-01 | End: 2021-12-01

## 2021-12-01 RX ORDER — GLYCOPYRROLATE 0.2 MG/ML
INJECTION INTRAMUSCULAR; INTRAVENOUS AS NEEDED
Status: DISCONTINUED | OUTPATIENT
Start: 2021-12-01 | End: 2021-12-01 | Stop reason: SURG

## 2021-12-01 RX ORDER — MELOXICAM 7.5 MG/1
7.5 TABLET ORAL DAILY
Qty: 14 TABLET | Refills: 0 | Status: SHIPPED | OUTPATIENT
Start: 2021-12-01 | End: 2021-12-15

## 2021-12-01 RX ORDER — MAGNESIUM HYDROXIDE 1200 MG/15ML
LIQUID ORAL AS NEEDED
Status: DISCONTINUED | OUTPATIENT
Start: 2021-12-01 | End: 2021-12-01 | Stop reason: HOSPADM

## 2021-12-01 RX ORDER — ONDANSETRON 2 MG/ML
4 INJECTION INTRAMUSCULAR; INTRAVENOUS ONCE AS NEEDED
Status: DISCONTINUED | OUTPATIENT
Start: 2021-12-01 | End: 2021-12-01 | Stop reason: HOSPADM

## 2021-12-01 RX ORDER — CEFAZOLIN SODIUM 2 G/100ML
2 INJECTION, SOLUTION INTRAVENOUS ONCE
Status: COMPLETED | OUTPATIENT
Start: 2021-12-01 | End: 2021-12-01

## 2021-12-01 RX ORDER — HYDROCODONE BITARTRATE AND ACETAMINOPHEN 7.5; 325 MG/1; MG/1
1 TABLET ORAL EVERY 4 HOURS PRN
Status: DISCONTINUED | OUTPATIENT
Start: 2021-12-01 | End: 2021-12-02 | Stop reason: HOSPADM

## 2021-12-01 RX ORDER — MELOXICAM 15 MG/1
15 TABLET ORAL DAILY
Status: DISCONTINUED | OUTPATIENT
Start: 2021-12-01 | End: 2021-12-02 | Stop reason: HOSPADM

## 2021-12-01 RX ORDER — SUCCINYLCHOLINE CHLORIDE 20 MG/ML
INJECTION INTRAMUSCULAR; INTRAVENOUS AS NEEDED
Status: DISCONTINUED | OUTPATIENT
Start: 2021-12-01 | End: 2021-12-01

## 2021-12-01 RX ORDER — FENTANYL CITRATE 50 UG/ML
50 INJECTION, SOLUTION INTRAMUSCULAR; INTRAVENOUS
Status: DISCONTINUED | OUTPATIENT
Start: 2021-12-01 | End: 2021-12-01 | Stop reason: HOSPADM

## 2021-12-01 RX ORDER — LIDOCAINE HYDROCHLORIDE 20 MG/ML
INJECTION, SOLUTION INFILTRATION; PERINEURAL AS NEEDED
Status: DISCONTINUED | OUTPATIENT
Start: 2021-12-01 | End: 2021-12-01

## 2021-12-01 RX ORDER — LIDOCAINE HYDROCHLORIDE ANHYDROUS AND DEXTROSE MONOHYDRATE 5; 400 G/100ML; MG/100ML
INJECTION, SOLUTION INTRAVENOUS CONTINUOUS PRN
Status: DISCONTINUED | OUTPATIENT
Start: 2021-12-01 | End: 2021-12-01 | Stop reason: SURG

## 2021-12-01 RX ORDER — CEFAZOLIN SODIUM 2 G/100ML
2 INJECTION, SOLUTION INTRAVENOUS EVERY 8 HOURS
Status: COMPLETED | OUTPATIENT
Start: 2021-12-01 | End: 2021-12-02

## 2021-12-01 RX ORDER — HYDROMORPHONE HYDROCHLORIDE 1 MG/ML
0.25 INJECTION, SOLUTION INTRAMUSCULAR; INTRAVENOUS; SUBCUTANEOUS
Status: DISCONTINUED | OUTPATIENT
Start: 2021-12-01 | End: 2021-12-01 | Stop reason: HOSPADM

## 2021-12-01 RX ORDER — POLYETHYLENE GLYCOL 3350 17 G/17G
17 POWDER, FOR SOLUTION ORAL 2 TIMES DAILY
Status: DISCONTINUED | OUTPATIENT
Start: 2021-12-01 | End: 2021-12-02 | Stop reason: HOSPADM

## 2021-12-01 RX ORDER — ROPIVACAINE HYDROCHLORIDE 2 MG/ML
INJECTION, SOLUTION EPIDURAL; INFILTRATION; PERINEURAL
Status: COMPLETED | OUTPATIENT
Start: 2021-12-01 | End: 2021-12-01

## 2021-12-01 RX ORDER — ACETAMINOPHEN 325 MG/1
325 TABLET ORAL EVERY 4 HOURS PRN
Status: DISCONTINUED | OUTPATIENT
Start: 2021-12-01 | End: 2021-12-02 | Stop reason: HOSPADM

## 2021-12-01 RX ORDER — PROMETHAZINE HYDROCHLORIDE 25 MG/1
25 SUPPOSITORY RECTAL ONCE AS NEEDED
Status: DISCONTINUED | OUTPATIENT
Start: 2021-12-01 | End: 2021-12-01 | Stop reason: HOSPADM

## 2021-12-01 RX ORDER — UREA 10 %
3 LOTION (ML) TOPICAL NIGHTLY PRN
Status: DISCONTINUED | OUTPATIENT
Start: 2021-12-01 | End: 2021-12-02 | Stop reason: HOSPADM

## 2021-12-01 RX ORDER — PROPOFOL 10 MG/ML
VIAL (ML) INTRAVENOUS CONTINUOUS PRN
Status: DISCONTINUED | OUTPATIENT
Start: 2021-12-01 | End: 2021-12-01 | Stop reason: SURG

## 2021-12-01 RX ORDER — SODIUM CHLORIDE, SODIUM LACTATE, POTASSIUM CHLORIDE, CALCIUM CHLORIDE 600; 310; 30; 20 MG/100ML; MG/100ML; MG/100ML; MG/100ML
9 INJECTION, SOLUTION INTRAVENOUS CONTINUOUS
Status: DISCONTINUED | OUTPATIENT
Start: 2021-12-01 | End: 2021-12-01 | Stop reason: HOSPADM

## 2021-12-01 RX ORDER — MELOXICAM 15 MG/1
15 TABLET ORAL ONCE
Status: COMPLETED | OUTPATIENT
Start: 2021-12-01 | End: 2021-12-01

## 2021-12-01 RX ORDER — LABETALOL HYDROCHLORIDE 5 MG/ML
5 INJECTION, SOLUTION INTRAVENOUS
Status: DISCONTINUED | OUTPATIENT
Start: 2021-12-01 | End: 2021-12-01 | Stop reason: HOSPADM

## 2021-12-01 RX ORDER — HYDROCODONE BITARTRATE AND ACETAMINOPHEN 7.5; 325 MG/1; MG/1
2 TABLET ORAL EVERY 4 HOURS PRN
Status: DISCONTINUED | OUTPATIENT
Start: 2021-12-01 | End: 2021-12-02 | Stop reason: HOSPADM

## 2021-12-01 RX ORDER — PROPOFOL 10 MG/ML
VIAL (ML) INTRAVENOUS AS NEEDED
Status: DISCONTINUED | OUTPATIENT
Start: 2021-12-01 | End: 2021-12-01 | Stop reason: SURG

## 2021-12-01 RX ORDER — FENTANYL CITRATE 50 UG/ML
INJECTION, SOLUTION INTRAMUSCULAR; INTRAVENOUS
Status: COMPLETED | OUTPATIENT
Start: 2021-12-01 | End: 2021-12-01

## 2021-12-01 RX ORDER — ROCURONIUM BROMIDE 10 MG/ML
INJECTION, SOLUTION INTRAVENOUS AS NEEDED
Status: DISCONTINUED | OUTPATIENT
Start: 2021-12-01 | End: 2021-12-01

## 2021-12-01 RX ORDER — ONDANSETRON 4 MG/1
4 TABLET, FILM COATED ORAL EVERY 6 HOURS PRN
Status: DISCONTINUED | OUTPATIENT
Start: 2021-12-01 | End: 2021-12-02 | Stop reason: HOSPADM

## 2021-12-01 RX ORDER — MIDAZOLAM HYDROCHLORIDE 1 MG/ML
0.5 INJECTION INTRAMUSCULAR; INTRAVENOUS
Status: DISCONTINUED | OUTPATIENT
Start: 2021-12-01 | End: 2021-12-01 | Stop reason: HOSPADM

## 2021-12-01 RX ORDER — TRANEXAMIC ACID 100 MG/ML
INJECTION, SOLUTION INTRAVENOUS AS NEEDED
Status: DISCONTINUED | OUTPATIENT
Start: 2021-12-01 | End: 2021-12-01 | Stop reason: SURG

## 2021-12-01 RX ORDER — ONDANSETRON 2 MG/ML
4 INJECTION INTRAMUSCULAR; INTRAVENOUS EVERY 6 HOURS PRN
Status: DISCONTINUED | OUTPATIENT
Start: 2021-12-01 | End: 2021-12-02 | Stop reason: HOSPADM

## 2021-12-01 RX ORDER — ASPIRIN 81 MG/1
81 TABLET ORAL EVERY 12 HOURS SCHEDULED
Status: DISCONTINUED | OUTPATIENT
Start: 2021-12-02 | End: 2021-12-02 | Stop reason: HOSPADM

## 2021-12-01 RX ORDER — OXYCODONE AND ACETAMINOPHEN 7.5; 325 MG/1; MG/1
2 TABLET ORAL EVERY 4 HOURS PRN
Status: DISCONTINUED | OUTPATIENT
Start: 2021-12-01 | End: 2021-12-01 | Stop reason: HOSPADM

## 2021-12-01 RX ORDER — SODIUM CHLORIDE 0.9 % (FLUSH) 0.9 %
10 SYRINGE (ML) INJECTION AS NEEDED
Status: DISCONTINUED | OUTPATIENT
Start: 2021-12-01 | End: 2021-12-01 | Stop reason: HOSPADM

## 2021-12-01 RX ORDER — PANTOPRAZOLE SODIUM 40 MG/1
40 TABLET, DELAYED RELEASE ORAL DAILY
Qty: 14 TABLET | Refills: 0 | Status: SHIPPED | OUTPATIENT
Start: 2021-12-01 | End: 2021-12-15

## 2021-12-01 RX ORDER — KETAMINE HYDROCHLORIDE 10 MG/ML
INJECTION INTRAMUSCULAR; INTRAVENOUS AS NEEDED
Status: DISCONTINUED | OUTPATIENT
Start: 2021-12-01 | End: 2021-12-01 | Stop reason: SURG

## 2021-12-01 RX ORDER — ONDANSETRON 2 MG/ML
INJECTION INTRAMUSCULAR; INTRAVENOUS AS NEEDED
Status: DISCONTINUED | OUTPATIENT
Start: 2021-12-01 | End: 2021-12-01 | Stop reason: SURG

## 2021-12-01 RX ORDER — FLUMAZENIL 0.1 MG/ML
0.2 INJECTION INTRAVENOUS AS NEEDED
Status: DISCONTINUED | OUTPATIENT
Start: 2021-12-01 | End: 2021-12-01 | Stop reason: HOSPADM

## 2021-12-01 RX ORDER — HYDROCODONE BITARTRATE AND ACETAMINOPHEN 7.5; 325 MG/1; MG/1
1-2 TABLET ORAL
Qty: 60 TABLET | Refills: 0 | Status: SHIPPED | OUTPATIENT
Start: 2021-12-01

## 2021-12-01 RX ORDER — DEXAMETHASONE SODIUM PHOSPHATE 10 MG/ML
INJECTION INTRAMUSCULAR; INTRAVENOUS AS NEEDED
Status: DISCONTINUED | OUTPATIENT
Start: 2021-12-01 | End: 2021-12-01 | Stop reason: SURG

## 2021-12-01 RX ORDER — PROMETHAZINE HYDROCHLORIDE 12.5 MG/1
12.5 TABLET ORAL EVERY 4 HOURS PRN
Status: CANCELLED | OUTPATIENT
Start: 2021-12-01

## 2021-12-01 RX ORDER — EPHEDRINE SULFATE 50 MG/ML
5 INJECTION, SOLUTION INTRAVENOUS ONCE AS NEEDED
Status: DISCONTINUED | OUTPATIENT
Start: 2021-12-01 | End: 2021-12-01 | Stop reason: HOSPADM

## 2021-12-01 RX ORDER — TAMSULOSIN HYDROCHLORIDE 0.4 MG/1
0.4 CAPSULE ORAL DAILY
Status: DISCONTINUED | OUTPATIENT
Start: 2021-12-01 | End: 2021-12-02 | Stop reason: HOSPADM

## 2021-12-01 RX ADMIN — LIDOCAINE HYDROCHLORIDE 80 MG: 20 INJECTION, SOLUTION INFILTRATION; PERINEURAL at 11:53

## 2021-12-01 RX ADMIN — HYDROMORPHONE HYDROCHLORIDE 0.25 MG: 1 INJECTION, SOLUTION INTRAMUSCULAR; INTRAVENOUS; SUBCUTANEOUS at 14:31

## 2021-12-01 RX ADMIN — Medication 100 MCG/KG/MIN: at 12:00

## 2021-12-01 RX ADMIN — HYDROMORPHONE HYDROCHLORIDE 0.25 MG: 1 INJECTION, SOLUTION INTRAMUSCULAR; INTRAVENOUS; SUBCUTANEOUS at 14:37

## 2021-12-01 RX ADMIN — SODIUM CHLORIDE, POTASSIUM CHLORIDE, SODIUM LACTATE AND CALCIUM CHLORIDE 9 ML/HR: 600; 310; 30; 20 INJECTION, SOLUTION INTRAVENOUS at 09:58

## 2021-12-01 RX ADMIN — DEXAMETHASONE SODIUM PHOSPHATE 8 MG: 10 INJECTION INTRAMUSCULAR; INTRAVENOUS at 12:05

## 2021-12-01 RX ADMIN — PREGABALIN 150 MG: 75 CAPSULE ORAL at 09:43

## 2021-12-01 RX ADMIN — ROCURONIUM BROMIDE 45 MG: 50 INJECTION INTRAVENOUS at 12:08

## 2021-12-01 RX ADMIN — KETAMINE HYDROCHLORIDE 10 MG: 10 INJECTION INTRAMUSCULAR; INTRAVENOUS at 11:53

## 2021-12-01 RX ADMIN — LIDOCAINE HYDROCHLORIDE 1 MG/KG/HR: 4 INJECTION, SOLUTION INTRAVENOUS at 12:06

## 2021-12-01 RX ADMIN — MIDAZOLAM 1 MG: 1 INJECTION INTRAMUSCULAR; INTRAVENOUS at 10:46

## 2021-12-01 RX ADMIN — SUCCINYLCHOLINE CHLORIDE 140 MG: 20 INJECTION, SOLUTION INTRAMUSCULAR; INTRAVENOUS; PARENTERAL at 11:53

## 2021-12-01 RX ADMIN — FAMOTIDINE 20 MG: 10 INJECTION INTRAVENOUS at 10:01

## 2021-12-01 RX ADMIN — ROCURONIUM BROMIDE 5 MG: 50 INJECTION INTRAVENOUS at 11:53

## 2021-12-01 RX ADMIN — GLYCOPYRROLATE 0.4 MG: 0.2 INJECTION INTRAMUSCULAR; INTRAVENOUS at 13:25

## 2021-12-01 RX ADMIN — VANCOMYCIN HYDROCHLORIDE 1750 MG: 10 INJECTION, POWDER, LYOPHILIZED, FOR SOLUTION INTRAVENOUS at 09:41

## 2021-12-01 RX ADMIN — TRANEXAMIC ACID 1000 MG: 1 INJECTION, SOLUTION INTRAVENOUS at 13:08

## 2021-12-01 RX ADMIN — ONDANSETRON 4 MG: 2 INJECTION INTRAMUSCULAR; INTRAVENOUS at 13:24

## 2021-12-01 RX ADMIN — TAMSULOSIN HYDROCHLORIDE 0.4 MG: 0.4 CAPSULE ORAL at 18:20

## 2021-12-01 RX ADMIN — FENTANYL CITRATE 50 MCG: 50 INJECTION INTRAMUSCULAR; INTRAVENOUS at 14:23

## 2021-12-01 RX ADMIN — MAGNESIUM SULFATE HEPTAHYDRATE 2 G: 500 INJECTION, SOLUTION INTRAMUSCULAR; INTRAVENOUS at 12:13

## 2021-12-01 RX ADMIN — SODIUM CHLORIDE, POTASSIUM CHLORIDE, SODIUM LACTATE AND CALCIUM CHLORIDE: 600; 310; 30; 20 INJECTION, SOLUTION INTRAVENOUS at 13:33

## 2021-12-01 RX ADMIN — POLYETHYLENE GLYCOL 3350 17 G: 17 POWDER, FOR SOLUTION ORAL at 21:07

## 2021-12-01 RX ADMIN — MUPIROCIN 1 APPLICATION: 20 OINTMENT TOPICAL at 21:07

## 2021-12-01 RX ADMIN — PROPOFOL 180 MG: 10 INJECTION, EMULSION INTRAVENOUS at 11:53

## 2021-12-01 RX ADMIN — MELOXICAM 15 MG: 15 TABLET ORAL at 09:43

## 2021-12-01 RX ADMIN — CEFAZOLIN SODIUM 2 G: 2 INJECTION, SOLUTION INTRAVENOUS at 18:20

## 2021-12-01 RX ADMIN — KETAMINE HYDROCHLORIDE 10 MG: 10 INJECTION INTRAMUSCULAR; INTRAVENOUS at 12:32

## 2021-12-01 RX ADMIN — LIDOCAINE HYDROCHLORIDE,EPINEPHRINE BITARTRATE 5 ML: 20; .01 INJECTION, SOLUTION INFILTRATION; PERINEURAL at 10:53

## 2021-12-01 RX ADMIN — HYDROCODONE BITARTRATE AND ACETAMINOPHEN 1 TABLET: 7.5; 325 TABLET ORAL at 22:17

## 2021-12-01 RX ADMIN — FENTANYL CITRATE 50 MCG: 0.05 INJECTION, SOLUTION INTRAMUSCULAR; INTRAVENOUS at 10:46

## 2021-12-01 RX ADMIN — HYDROCODONE BITARTRATE AND ACETAMINOPHEN 2 TABLET: 7.5; 325 TABLET ORAL at 14:54

## 2021-12-01 RX ADMIN — CEFAZOLIN SODIUM 2 G: 2 INJECTION, SOLUTION INTRAVENOUS at 11:32

## 2021-12-01 RX ADMIN — NEOSTIGMINE METHYLSULFATE 2 MG: 0.5 INJECTION INTRAVENOUS at 13:27

## 2021-12-01 RX ADMIN — GLYCOPYRROLATE 0.2 MG: 0.2 INJECTION INTRAMUSCULAR; INTRAVENOUS at 11:50

## 2021-12-01 RX ADMIN — ROPIVACAINE HYDROCHLORIDE 20 ML: 2 INJECTION, SOLUTION EPIDURAL; INFILTRATION at 10:53

## 2021-12-01 NOTE — ANESTHESIA POSTPROCEDURE EVALUATION
Patient: Aldair Cordova    Procedure Summary     Date: 12/01/21 Room / Location: [facility] OR [address] [place] [address] MAIN OR    Anesthesia Start: 1144 Anesthesia Stop: 1354    Procedure: TOTAL KNEE ARTHROPLASTY (Left Knee) Diagnosis:       Primary osteoarthritis of left knee      (Primary osteoarthritis of left knee [M17.12])    Surgeons: Rehan Fernández MD Provider: Robinson Toussaint MD    Anesthesia Type: general with block ASA Status: 2          Anesthesia Type: general with block    Vitals  Vitals Value Taken Time   /98 12/01/21 1506   Temp 36.4 °C (97.6 °F) 12/01/21 1352   Pulse 85 12/01/21 1515   Resp 16 12/01/21 1505   SpO2 96 % 12/01/21 1515   Vitals shown include unvalidated device data.        Post Anesthesia Care and Evaluation    Patient location during evaluation: PACU  Patient participation: complete - patient participated  Level of consciousness: awake and alert  Pain management: adequate  Airway patency: patent  Anesthetic complications: No anesthetic complications    Cardiovascular status: acceptable  Respiratory status: acceptable  Hydration status: acceptable    Comments: --------------------            12/01/21               1505     --------------------   BP:       128/98     Pulse:      80       Resp:       16       Temp:                SpO2:      100%     --------------------

## 2021-12-01 NOTE — PLAN OF CARE
Goal Outcome Evaluation:  Plan of Care Reviewed With: patient, spouse           Outcome Summary: pt is POD 0 L TKR, presents with impaired ROM and strength L knee, dec functional mobility and activity tolerance, he will  benefit from PT to address, pt did well this afternoon of surgery, able to walk 30 ft with Rwx with CGA and perform ex without dififculty, anticipate good progress and pt to go home tomorrow with assist of spouse and home health, pt lives with wife and has 2 steps to enter, has a rwx and BSC from his R TKR approx 1.5 years ago.  Patient was intermittently wearing a face mask during this therapy encounter. Therapist used appropriate personal protective equipment including eye protection, mask, and gloves.  Mask used was standard procedure mask. Appropriate PPE was worn during the entire therapy session. Hand hygiene was completed before and after therapy session. Patient is not in enhanced droplet precautions.

## 2021-12-01 NOTE — THERAPY EVALUATION
Patient Name: Aldair Cordova  : 1950    MRN: [id number]                              Today's Date: 2021       Admit Date: 2021    Visit Dx:     ICD-10-CM ICD-9-CM   1. Primary osteoarthritis of left knee  M17.12 715.16     Patient Active Problem List   Diagnosis   • Anxiety   • Arthritis   • Gastroesophageal reflux disease with esophagitis   • Hyperlipidemia   • Hypertension   • Nocturia   • Thrombocytopenia (HCC)   • Morbid obesity due to excess calories (HCC)   • Acute pain of left knee   • Osteoarthrosis, localized, primary, knee   • Health care maintenance   • KIEL on CPAP   • Hypersomnia with sleep apnea   • Obesity (BMI 30-39.9)   • Sleep deprivation   • Sleep related hypoxia   • Shift work sleep disorder   • OA (osteoarthritis) of knee   • Primary osteoarthritis of right knee   • Degenerative, intervertebral disc, cervical     Past Medical History:   Diagnosis Date   • Anxiety    • Benign essential hypertension    • BPH (benign prostatic hyperplasia)    • Degenerative, intervertebral disc, cervical    • GERD (gastroesophageal reflux disease)    • History of kidney stones    • HLD (hyperlipidemia)    • Nocturia    • Osteoarthrosis, localized, primary, knee    • Sleep apnea     uses cpap     Past Surgical History:   Procedure Laterality Date   • COLONOSCOPY  10/21/2016   • CYST REMOVAL      OFF NECK AND HEAD   • EPIDURAL BLOCK     • EXTRACORPOREAL SHOCK WAVE LITHOTRIPSY (ESWL)     • KNEE ARTHROSCOPY Left    • TOTAL KNEE ARTHROPLASTY Right 7/10/2020    Procedure: TOTAL KNEE ARTHROPLASTY;  Surgeon: Rehan Fernández MD;  Location: [facility];  Service: Orthopedics;  Laterality: Right;      General Information     Row Name 21 1619          Physical Therapy Time and Intention    Document Type evaluation  -PC     Mode of Treatment physical therapy  -PC     Row Name 21 1619          General Information    Patient Profile Reviewed yes  -PC     Prior Level of Function independent:  -PC      Barriers to Rehab none identified  -PC     Row Name 12/01/21 1619          Living Environment    Lives With spouse  -PC     Row Name 12/01/21 1619          Home Main Entrance    Number of Stairs, Main Entrance two  -PC     Stair Railings, Main Entrance --  has a pole that he can hold on to  -PC     Row Name 12/01/21 1619          Stairs Within Home, Primary    Number of Stairs, Within Home, Primary none  -PC     Row Name 12/01/21 1619          Cognition    Orientation Status (Cognition) oriented x 4  -PC           User Key  (r) = Recorded By, (t) = Taken By, (c) = Cosigned By    Initials Name Provider Type    PC Maggi Powers PT Physical Therapist               Mobility     Row Name 12/01/21 1620          Bed Mobility    Bed Mobility supine-sit  -PC     Supine-Sit [place] (Bed Mobility) standby assist  -PC     Row Name 12/01/21 1620          Sit-Stand Transfer    Sit-Stand [place] (Transfers) standby assist  -PC     Row Name 12/01/21 1620          Gait/Stairs (Locomotion)    [place] Level (Gait) contact guard; supervision  -PC     Assistive Device (Gait) walker, front-wheeled  -PC     Distance in Feet (Gait) 30 ft  -PC     Deviations/Abnormal Patterns (Gait) antalgic; rohan decreased; gait speed decreased; stride length decreased  -PC           User Key  (r) = Recorded By, (t) = Taken By, (c) = Cosigned By    Initials Name Provider Type    PC Maggi Powers PT Physical Therapist               Obj/Interventions     Row Name 12/01/21 1620          Range of Motion Comprehensive    Comment, General Range of Motion WFL x L knee  -PC     Row Name 12/01/21 1620          Strength Comprehensive (MMT)    Comment, General Manual Muscle Testing (MMT) Assessment WNL x L LE NT, pt can independently SLR  -PC     Row Name 12/01/21 1620          Sensory Assessment (Somatosensory)    Sensory Assessment (Somatosensory) sensation intact  -PC           User Key  (r) = Recorded By, (t) = Taken By, (c) = Cosigned  By    Initials Name Provider Type    PC Maggi Powers, PT Physical Therapist               Goals/Plan     Row Name 12/01/21 1623          Transfer Goal 1 (PT)    Activity/Assistive Device (Transfer Goal 1, PT) sit-to-stand/stand-to-sit  -PC     [place] Level/Cues Needed (Transfer Goal 1, PT) standby assist  -PC     Time Frame (Transfer Goal 1, PT) 3 days  -PC     Row Name 12/01/21 1623          Gait Training Goal 1 (PT)    Activity/Assistive Device (Gait Training Goal 1, PT) gait (walking locomotion); assistive device use  -PC     [place] Level (Gait Training Goal 1, PT) standby assist  -PC     Distance (Gait Training Goal 1, PT) 80 ft  -PC     Time Frame (Gait Training Goal 1, PT) 3 days  -PC     Row Name 12/01/21 1623          ROM Goal 1 (PT)    ROM Goal 1 (PT) 5-90  -PC     Time Frame (ROM Goal 1, PT) 3 days  -PC     Row Name 12/01/21 1623          Stairs Goal 1 (PT)    Activity/Assistive Device (Stairs Goal 1, PT) ascending stairs; descending stairs  -PC     [place] Level/Cues Needed (Stairs Goal 1, PT) contact guard assist  -PC     Number of Stairs (Stairs Goal 1, PT) 2  -PC     Time Frame (Stairs Goal 1, PT) 3 days  -PC           User Key  (r) = Recorded By, (t) = Taken By, (c) = Cosigned By    Initials Name Provider Type    PC Maggi Powers, PT Physical Therapist               Clinical Impression     Row Name 12/01/21 1621          Pain    Additional Documentation Pain Scale: Numbers Pre/Post-Treatment (Group)  -PC     Row Name 12/01/21 1621          Pain Scale: Numbers Pre/Post-Treatment    Pretreatment Pain Rating 2/10  -PC     Pain Location - Side Left  -PC     Pain Location knee  -PC     Pain Intervention(s) Medication (See MAR); Cold applied; Repositioned  -PC     Row Name 12/01/21 1621          Plan of Care Review    Plan of Care Reviewed With patient; spouse  -PC     Outcome Summary pt is POD 0 L TKR, presents with impaired ROM and strength L knee, dec functional mobility and  activity tolerance, he will  benefit from PT to address, pt did well this afternoon of surgery, able to walk 30 ft with Rwx with CGA and perform ex without dififculty, anticipate good progress and pt to go home tomorrow with assist of spouse and home health, pt lives with wife and has 2 steps to enter, has a rwx and BSC from his R TKR approx 1.5 years ago.  -PC     Row Name 12/01/21 1621          Therapy Assessment/Plan (PT)    Rehab Potential (PT) good, to achieve stated therapy goals  -PC     Criteria for Skilled Interventions Met (PT) yes; meets criteria  -PC     Row Name 12/01/21 1621          Positioning and Restraints    Pre-Treatment Position in bed  -PC     Post Treatment Position chair  -PC     In Chair reclined; call light within reach; encouraged to call for assist; exit alarm on  -PC           User Key  (r) = Recorded By, (t) = Taken By, (c) = Cosigned By    Initials Name Provider Type    PC Maggi Powers PT Physical Therapist               Outcome Measures     Row Name 12/01/21 1624          How much help from another person do you currently need...    Turning from your back to your side while in flat bed without using bedrails? 4  -PC     Moving from lying on back to sitting on the side of a flat bed without bedrails? 4  -PC     Moving to and from a bed to a chair (including a wheelchair)? 3  -PC     Standing up from a chair using your arms (e.g., wheelchair, bedside chair)? 3  -PC     Climbing 3-5 steps with a railing? 3  -PC     To walk in hospital room? 3  -PC     AM-PAC 6 Clicks Score (PT) 20  -PC     Row Name 12/01/21 1624          Functional Assessment    Outcome Measure Options AM-PAC 6 Clicks Basic Mobility (PT)  -PC           User Key  (r) = Recorded By, (t) = Taken By, (c) = Cosigned By    Initials Name Provider Type    PC Maggi Powers PT Physical Therapist                             Physical Therapy Education                 Title: PT OT SLP Therapies (Done)     Topic: Physical  Therapy (Done)     Point: Mobility training (Done)     Learning Progress Summary           Patient Acceptance, E,D, DU by PC at 12/1/2021 1625                   Point: Home exercise program (Done)     Learning Progress Summary           Patient Acceptance, E,D, DU by PC at 12/1/2021 1625                   Point: Body mechanics (Done)     Learning Progress Summary           Patient Acceptance, E,D, DU by PC at 12/1/2021 1625                   Point: Precautions (Done)     Learning Progress Summary           Patient Acceptance, E,D, DU by PC at 12/1/2021 1625                               User Key     Initials Effective Dates Name Provider Type Discipline     06/16/21 -  Maggi Powers, PT Physical Therapist PT              PT Recommendation and Plan  Planned Therapy Interventions (PT): bed mobility training, gait training, transfer training, ROM (range of motion), stair training, strengthening  Plan of Care Reviewed With: patient, spouse  Outcome Summary: pt is POD 0 L TKR, presents with impaired ROM and strength L knee, dec functional mobility and activity tolerance, he will  benefit from PT to address, pt did well this afternoon of surgery, able to walk 30 ft with Rwx with CGA and perform ex without dififculty, anticipate good progress and pt to go home tomorrow with assist of spouse and home health, pt lives with wife and has 2 steps to enter, has a rwx and BSC from his R TKR approx 1.5 years ago.     Time Calculation:    PT Charges     Row Name 12/01/21 1626             Time Calculation    Start Time 1554  -PC      Stop Time 1614  -PC      Time Calculation (min) 20 min  -PC      PT Received On 12/01/21  -PC      PT - Next Appointment 12/02/21  -PC      PT Goal Re-Cert Due Date 12/04/21  -PC              Time Calculation- PT    Total Timed Code Minutes- PT 15 minute(s)  -PC            User Key  (r) = Recorded By, (t) = Taken By, (c) = Cosigned By    Initials Name Provider Type    PC Maggi Powers, PT Physical  Therapist              Therapy Charges for Today     Code Description Service Date Service Provider Modifiers Qty    24323185129 HC PT EVAL MOD COMPLEXITY 2 12/1/2021 Maggi Powers, PT GP 1    73787661532 HC PT THER PROC EA 15 MIN 12/1/2021 Maggi Powers, PT GP 1          PT G-Codes  Outcome Measure Options: AM-PAC 6 Clicks Basic Mobility (PT)  AM-PAC 6 Clicks Score (PT): 20    Maggi Powers, PT  12/1/2021

## 2021-12-01 NOTE — ANESTHESIA POSTPROCEDURE EVALUATION
Patient: Aldair Cordova    Procedure Summary     Date: 12/01/21 Room / Location: [facility] OR [address] [place] [address] MAIN OR    Anesthesia Start: 1144 Anesthesia Stop: 1354    Procedure: TOTAL KNEE ARTHROPLASTY (Left Knee) Diagnosis:       Primary osteoarthritis of left knee      (Primary osteoarthritis of left knee [M17.12])    Surgeons: Rehan Fernández MD Provider: Robinson Toussaint MD    Anesthesia Type: general with block ASA Status: 2          Anesthesia Type: general with block    Vitals  Vitals Value Taken Time   /98 12/01/21 1506   Temp 36.4 °C (97.6 °F) 12/01/21 1352   Pulse 85 12/01/21 1515   Resp 16 12/01/21 1505   SpO2 96 % 12/01/21 1515   Vitals shown include unvalidated device data.        Post Anesthesia Care and Evaluation      Comments: Pt. Discharged prior to being evaluated by anesthesia.  Chart is reviewed and no complications are noted.  THIS CASE IS NOT MEDICALLY DIRECTED

## 2021-12-01 NOTE — ANESTHESIA PROCEDURE NOTES
Airway  Urgency: elective    Date/Time: 12/1/2021 11:57 AM  Airway not difficult    General Information and Staff    Patient location during procedure: OR  Anesthesiologist: Robinson Toussaint MD  CRNA: Lee Lockhart CRNA    Indications and Patient Condition  Indications for airway management: airway protection    Preoxygenated: yes  MILS maintained throughout  Mask difficulty assessment: 1 - vent by mask    Final Airway Details  Final airway type: endotracheal airway      Successful airway: ETT  Cuffed: yes   Successful intubation technique: direct laryngoscopy  Endotracheal tube insertion site: oral  Blade: Dakot[place]  Blade size: 3  ETT size (mm): 8.0  Cormack-Lehane Classification: grade I - full view of glottis  Placement verified by: chest auscultation and capnometry   Inital cuff pressure (cm H2O): 22  Cuff volume (mL): 7  Measured from: lips  ETT/EBT  to lips (cm): 24  Number of attempts at approach: 1

## 2021-12-01 NOTE — ANESTHESIA PROCEDURE NOTES
Peripheral Block      Patient reassessed immediately prior to procedure    Patient location during procedure: pre-op  Reason for block: at surgeon's request and post-op pain management  Performed by  Anesthesiologist: Freddy Lemus MD  Preanesthetic Checklist  Completed: patient identified, IV checked, site marked, risks and benefits discussed, surgical consent, monitors and equipment checked, pre-op evaluation and timeout performed  Prep:  Pt Position: supine  Sterile barriers:cap, gloves and mask  Prep: ChloraPrep  Patient monitoring: blood pressure monitoring, continuous pulse oximetry and EKG  Procedure    Sedation: yes  Performed under: local infiltration  Guidance:ultrasound guided    ULTRASOUND INTERPRETATION. Using ultrasound guidance a 22 G gauge needle was placed in close proximity to the nerve, at which point, under ultrasound guidance anesthetic was injected in the area of the nerve and spread of the anesthesia was seen on ultrasound in close proximity thereto.  There were no abnormalities seen on ultrasound; a digital image was taken; and the patient tolerated the procedure with no complications. Images:still images obtained, printed/placed on chart    Laterality:left  Block Type:adductor canal block and femoral  Injection Technique:single-shot  Needle Type:echogenic  Needle Gauge:22 G      Medications Used: ropivacaine (NAROPIN) 0.2 % injection, 20 mL  lidocaine-EPINEPHrine (XYLOCAINE W/EPI) 2 %-1:717474 injection, 5 mL  Med administered at 12/1/2021 10:53 AM      Post Assessment  Injection Assessment: negative aspiration for heme, no paresthesia on injection and incremental injection  Patient Tolerance:comfortable throughout block  Complications:no  Additional Notes  Ultrasound guidance was used for needle placement and verify medication disbursement.

## 2021-12-01 NOTE — OP NOTE
Name: Aldair Cordova  YOB: 1950    DATE OF SURGERY: 12/1/2021    PREOPERATIVE DIAGNOSIS: Left knee end-stage osteoarthritis    POSTOPERATIVE DIAGNOSIS: Left knee end-stage osteoarthritis    PROCEDURE PERFORMED: Left total knee replacement    SURGEON: Rehan Fernández M.D.    ASSISTANT: ERNESTO BERGERON    A surgical assistant was integral in ensuring a successful outcome with this procedure.  The assistant was utilized to assist in positioning the patient, draping the patient, was used throughout the case to provide with retraction of tissues, suctioning of blood and body fluids for visualization, positioning of the extremity to allow for proper exposure so that I could perform the procedure.  Without the use of a surgical assistant during this procedure I feel that the outcome may have been compromised or would have been suboptimal or at risk for complications.    IMPLANTS: [employer] Nephtremaine Legion:     Implant Name Type Inv. Item Serial No.  Lot No. LRB No. Used Action   CMT BONE PALACOS R HI/VISC 1X40 - [id number] Implant CMT BONE PALACOS R HI/VISC 1X40  [facility] [id number] Left 2 Implanted   DEV CONTRL TISS STRATAFIX SYMM PDS PLUS BRANDO CT-1 60CM - [id number] Implant DEV CONTRL TISS STRATAFIX SYMM PDS PLUS BRANDO CT-1 60CM  [employer]  DIV [employer] J AND J REMLRM Left 1 Implanted   DEV CONTRL TISS STRATAFIXSPIRALMNCRYL PLSPS2 REV3/0 45CM - [id number] Implant DEV CONTRL TISS STRATAFIXSPIRALMNCRYL PLSPS2 REV3/0 45CM  [employer]  DIV OF J AND J [id number] Left 1 Implanted   COMP FEM LEGION OXINIUM CR SZ6 LT - [id number] Implant COMP FEM LEGION OXINIUM CR SZ6 LT  [employer] [id number] Left 1 Implanted   BASE TIB/KN GEN2 NONPOR TI SZ7 LT - [id number] Implant BASE TIB/KN GEN2 NONPOR TI SZ7 LT  [employer] [id number] Left 1 Implanted   PAT GEN2 BICONVEX [id number] - [id number] Implant PAT GEN2 BICONVEX [id number]  [employer] [id number] Left 1 Implanted   INSRT ART LEGION CR HF XLPE SZ7TO8 11MM -  [id number] Implant RUST ART LEGION CR HF XLPE SZ7TO8 11MM  [employer] [id number] Left 1 Implanted       Estimated Blood Loss: 200cc  Specimens : none  Complications: none    DESCRIPTION OF PROCEDURE: The patient was taken to the operating room and placed in the supine position. A sequential compression device was carefully placed on the non-operative leg. Preoperative antibiotics were administered. Surgical time out was performed. After adequate induction of anesthesia, the leg was prepped and draped in the usual sterile fashion, exsanguinated with an Esmarch bandage and the tourniquet inflated to 250 mmHg. A midline incision was performed followed by a medial parapatellar arthrotomy. The patella was subluxed laterally.  A portion of the fat pad, ACL, and anterior horns of the meniscus were excised. The drill hole was placed in the distal femur and the canal was the irrigated and suctioned. The IM manav was placed and a 5 degree distal valgus cut was performed on the femur. The femur was then sized with a sizing guide. The femoral cutting block was placed and all femoral cuts were performed. The proximal tibia was exposed. We used the extramedullary tibial cutting guide set for removal of 9mm of bone off the high side. The tibial cut was performed. The posterior horns of the menisci were excised. The posterior osteophytes were removed. Flexion extension blocks were then used to balance the knee. The tibial cut surface was then sized with the sizing templates and the tibial and femoral trial were then placed. The knee was placed in full extension and then the tibial tray rotation was then matched to the femoral rotation and marked.    Attention was then placed to the patella. The patella was noted to track centrally through range of motion. The patella was then sized with the trials. The thickness of the patella was then measured. The patella was resurfaced and the surrounding osteophytes were removed. The  preoperative thickness was reproduced. The patella tracked centrally through range of motion.   At this point all trial components were removed, the knee was copiously irrigated with pulsed lavage, and the knee was injected with anesthetic cocktail solution. The cut surfaces were then dried with clean lap sponges, and the components were cemented tibia, followed by femur, then patella. The knee was held in full extension and all excess cement was removed. The knee was held still until the cement had completely hardened. We then placed the trial polyethylene spacer which resulted in full extension and excellent flexion-extension balance. We placed the final polyethylene spacer.   The knee was then copiously irrigated. The tourniquet was then released. There was excellent hemostasis. We placed a one-eighth inch Hemovac drain. We closed the knee in multiple layers in standard fashion. Sterile dressing were applied. At the end of the case, the sponge and needle counts were reported as being correct. There were no known complications. The patient was then transported to the recovery room.      Rehan Fernández M.D.

## 2021-12-01 NOTE — DISCHARGE PLACEMENT REQUEST
"Aldair Araiza (71 y.o. Male)             Date of Birth Social Security Number Address Home Phone MRN    1950  [address] [place] [id number] [phone] [id number]    Mandaen Marital Status             Religious        Admission Date Admission Type Admitting Provider Attending Provider Department, Room/Bed    12/1/21 Elective Rehan Fernández MD Brown, Reid B, MD [address], 76/1    Discharge Date Discharge Disposition Discharge Destination                         Attending Provider: Rehan Fernández MD    Allergies: Xanax [Alprazolam]    Isolation: None   Infection: None   Code Status: CPR   Advance Care Planning Activity    Ht: 182.9 cm (72\")   Wt: 117 kg (257 lb 0.9 oz)    Admission Cmt: None   Principal Problem: OA (osteoarthritis) of knee [M17.10]                 Active Insurance as of 12/1/2021     Primary Coverage     Payor Plan Insurance Group Employer/Plan Group    MEDICARE MEDICARE A & B      Payor Plan Address Payor Plan Phone Number Payor Plan Fax Number Effective Dates    [address] [phone]  1/1/2017 - None Entered    [facility] [id number]       Subscriber Name Subscriber Birth Date Member ID       ALDAIR ARAIZA 1950 [id number]           Secondary Coverage     Payor Plan Insurance Group Employer/Plan Group    [facility] SUPP [id number]     Payor Plan Address Payor Plan Phone Number Payor Plan Fax Number Effective Dates    [address]   5/1/2021 - None Entered    [facility] [id number]       Subscriber Name Subscriber Birth Date Member ID       ALDAIR ARAIZA 1950 [id number]                 Emergency Contacts      (Rel.) Home Phone Work Phone Mobile Phone    Silviano Araiza (Spouse) [phone] -- [phone]          "

## 2021-12-01 NOTE — ANESTHESIA PREPROCEDURE EVALUATION
Anesthesia Evaluation     Patient summary reviewed and Nursing notes reviewed   no history of anesthetic complications:  NPO Solid Status: > 6 hours  NPO Liquid Status: > 6 hours           Airway   Mallampati: II  TM distance: >3 FB  Neck ROM: full  no difficulty expected and No difficulty expected  Dental - normal exam     Pulmonary - negative pulmonary ROS and normal exam    breath sounds clear to auscultation  (-) rhonchi, decreased breath sounds, wheezes, rales, stridor  Cardiovascular - normal exam    NYHA Classification: I  ECG reviewed  Rhythm: regular  Rate: normal    (+) hypertension, hyperlipidemia,   (-) murmur, weak pulses, friction rub, systolic click, carotid bruits, JVD, peripheral edema      Neuro/Psych- negative ROS  GI/Hepatic/Renal/Endo    (+) obesity,  GERD,      Musculoskeletal     Abdominal  - normal exam    Abdomen: soft.   Substance History - negative use     OB/GYN negative ob/gyn ROS         Other   arthritis,                      Anesthesia Plan    ASA 2     general with block     intravenous induction     Anesthetic plan, all risks, benefits, and alternatives have been provided, discussed and informed consent has been obtained with: patient.

## 2021-12-02 VITALS
DIASTOLIC BLOOD PRESSURE: 76 MMHG | WEIGHT: 257.06 LBS | BODY MASS INDEX: 34.82 KG/M2 | SYSTOLIC BLOOD PRESSURE: 137 MMHG | HEIGHT: 72 IN | HEART RATE: 95 BPM | OXYGEN SATURATION: 93 % | TEMPERATURE: 97.8 F | RESPIRATION RATE: 16 BRPM

## 2021-12-02 LAB
HCT VFR BLD AUTO: 40.9 % (ref 37.5–51)
HGB BLD-MCNC: 14 G/DL (ref 13–17.7)

## 2021-12-02 PROCEDURE — G0378 HOSPITAL OBSERVATION PER HR: HCPCS

## 2021-12-02 PROCEDURE — 99024 POSTOP FOLLOW-UP VISIT: CPT | Performed by: NURSE PRACTITIONER

## 2021-12-02 PROCEDURE — 0 CEFAZOLIN IN DEXTROSE 2-4 GM/100ML-% SOLUTION: Performed by: NURSE PRACTITIONER

## 2021-12-02 PROCEDURE — 85018 HEMOGLOBIN: CPT | Performed by: NURSE PRACTITIONER

## 2021-12-02 PROCEDURE — 85014 HEMATOCRIT: CPT | Performed by: NURSE PRACTITIONER

## 2021-12-02 RX ADMIN — MELOXICAM 15 MG: 15 TABLET ORAL at 08:26

## 2021-12-02 RX ADMIN — TAMSULOSIN HYDROCHLORIDE 0.4 MG: 0.4 CAPSULE ORAL at 08:26

## 2021-12-02 RX ADMIN — CEFAZOLIN SODIUM 2 G: 2 INJECTION, SOLUTION INTRAVENOUS at 03:02

## 2021-12-02 RX ADMIN — POLYETHYLENE GLYCOL 3350 17 G: 17 POWDER, FOR SOLUTION ORAL at 08:26

## 2021-12-02 RX ADMIN — ASPIRIN 81 MG: 81 TABLET, COATED ORAL at 08:26

## 2021-12-02 RX ADMIN — MUPIROCIN 1 APPLICATION: 20 OINTMENT TOPICAL at 08:26

## 2021-12-02 RX ADMIN — LISINOPRIL 10 MG: 10 TABLET ORAL at 08:26

## 2021-12-02 RX ADMIN — PANTOPRAZOLE SODIUM 40 MG: 40 TABLET, DELAYED RELEASE ORAL at 08:26

## 2021-12-02 RX ADMIN — HYDROCODONE BITARTRATE AND ACETAMINOPHEN 1 TABLET: 7.5; 325 TABLET ORAL at 06:09

## 2021-12-02 NOTE — PLAN OF CARE
Goal Outcome Evaluation:  Plan of Care Reviewed With: patient        Progress: improving  Outcome Summary: Pt remains stable. Ambulates with walker use and 1 assist. MARYLU is cdi with hv in place. Voiding adequately. Pain well controlled. VSS. Educated on sleep apnea management, home CPAP used through the night. Plans to d/c home today. CUONG.

## 2021-12-02 NOTE — PROGRESS NOTES
Continued Stay Note  [facility]     Patient Name: Aldair Cordova  MRN: [id number]  Today's Date: 12/2/2021    Admit Date: 12/1/2021     Discharge Plan     Row Name 12/02/21 1232       Plan    Plan [facility]    Patient/Family in Agreement with Plan yes    Plan Comments Spoke with pt, verified correct information on facesheet and explained the role of CCP. Pt would like to d/c home with [facility], referral given to [place] with [facility] who states they are able to accept. Plan will be to d/c home with [facility] and family support.    Final Discharge Disposition Code 01 - home or self-care    Final Note Pt to d/c home with [facility]               Discharge Codes    No documentation.               Expected Discharge Date and Time     Expected Discharge Date Expected Discharge Time    Dec 2, 2021             Latisha Barrett RN

## 2021-12-02 NOTE — PLAN OF CARE
Goal Outcome Evaluation:  Plan of Care Reviewed With: patient        Progress: improving  Outcome Summary: Pt is 72 y/o male, s/p left TKA. Dressing is cdi, vss, n/v intact, pain controlled with po meds. Pt ambulated with standby assist. Educated pt on pain medication administration. Will continue to monitor oxygen r/t history of KIEL. D/c home tomorrow.

## 2021-12-02 NOTE — DISCHARGE SUMMARY
Patient Name: Aldair Cordova  Patient YOB: 1950    Date of Admission:  12/1/2021  Date of Discharge:  12/2/2021  Discharge Diagnosis: NE TOTAL KNEE ARTHROPLASTY [23882] (TOTAL KNEE ARTHROPLASTY)  Presenting Problem/History of Present Illness: Primary osteoarthritis of left knee [M17.12]  Admitting Physician: Dr Rehan Fernández  Consults:   Consults     No orders found for last 30 day(s).          DETAILS OF HOSPITAL STAY:  Patient is a 71 y.o. male was admitted to the floor following the above procedure and underwent an uncomplicated hospital stay.  Patient did well with physical therapy and was ambulating well without problems.  On the day of discharge the wound was clean, dry and intact and calf was soft and non tender and Homans sign was negative.  Patient was tolerating   Diet Instructions     Advance Diet as Tolerated      May advance diet as tolerated while in hospital.    Diet:      Diet Texture / Consistency: Regular       without problems.  Patient will be discharged home.    Condition on Discharge:  Stable    Vital Signs  Temp:  [97.4 °F (36.3 °C)-98.4 °F (36.9 °C)] 97.8 °F (36.6 °C)  Heart Rate:  [57-95] 95  Resp:  [12-20] 16  BP: (101-159)/(61-98) 137/76    LABS:      No visits with results within 3 Day(s) from this visit.   Latest known visit with results is:   Pre-Admission Testing on 11/15/2021   Component Date Value Ref Range Status   • COVID19 11/30/2021 Not Detected  Not Detected - Ref. Range Final   • Glucose 11/15/2021 104* 65 - 99 mg/dL Final   • BUN 11/15/2021 14  8 - 23 mg/dL Final   • Creatinine 11/15/2021 1.18  0.76 - 1.27 mg/dL Final   • Sodium 11/15/2021 140  136 - 145 mmol/L Final   • Potassium 11/15/2021 4.1  3.5 - 5.2 mmol/L Final   • Chloride 11/15/2021 104  98 - 107 mmol/L Final   • CO2 11/15/2021 25.6  22.0 - 29.0 mmol/L Final   • Calcium 11/15/2021 10.2  8.6 - 10.5 mg/dL Final   • eGFR Non  Amer 11/15/2021 61  >60 mL/min/1.73 Final   • BUN/Creatinine Ratio  11/15/2021 11.9  7.0 - 25.0 Final   • Anion Gap 11/15/2021 10.4  5.0 - 15.0 mmol/L Final   • WBC 11/15/2021 6.29  3.40 - 10.80 10*3/mm3 Final   • RBC 11/15/2021 5.36  4.14 - 5.80 10*6/mm3 Final   • Hemoglobin 11/15/2021 16.1  13.0 - 17.7 g/dL Final   • Hematocrit 11/15/2021 45.7  37.5 - 51.0 % Final   • MCV 11/15/2021 85.3  79.0 - 97.0 fL Final   • MCH 11/15/2021 30.0  26.6 - 33.0 pg Final   • MCHC 11/15/2021 35.2  31.5 - 35.7 g/dL Final   • RDW 11/15/2021 12.2* 12.3 - 15.4 % Final   • RDW-SD 11/15/2021 37.7  37.0 - 54.0 fl Final   • MPV 11/15/2021 10.7  6.0 - 12.0 fL Final   • Platelets 11/15/2021 144  140 - 450 10*3/mm3 Final   • QT Interval 11/15/2021 407  ms Final   • Color, UA 11/15/2021 Yellow  Yellow, Straw Final   • Appearance, UA 11/15/2021 Clear  Clear Final   • pH, UA 11/15/2021 <=5.0  5.0 - 8.0 Final   • Specific Gravity, UA 11/15/2021 1.015  1.005 - 1.030 Final   • Glucose, UA 11/15/2021 Negative  Negative Final   • Ketones, UA 11/15/2021 Negative  Negative Final   • Bilirubin, UA 11/15/2021 Negative  Negative Final   • Blood, UA 11/15/2021 Negative  Negative Final   • Protein, UA 11/15/2021 Negative  Negative Final   • Leuk Esterase, UA 11/15/2021 Negative  Negative Final   • Nitrite, UA 11/15/2021 Negative  Negative Final   • Urobilinogen, UA 11/15/2021 0.2 E.U./dL  0.2 - 1.0 E.U./dL Final       No results found.    Discharge Medications     Discharge Medications      New Medications      Instructions Start Date   HYDROcodone-acetaminophen 7.5-325 MG per tablet  Commonly known as: NORCO   Take 1-2 tablets by mouth every 4-6 hours as needed for pain.  Take 2 only when in severe pain.      ondansetron 4 MG tablet  Commonly known as: Zofran   4 mg, Oral, Every 8 Hours PRN      polyethylene glycol 17 GM/SCOOP powder  Commonly known as: MIRALAX   Mix 17g (1 capful) in liquid and take by mouth 2 (Two) Times a Day for 7 days.         Changes to Medications      Instructions Start Date   aspirin 81 MG EC  tablet  What changed: See the new instructions.   1 tablet 2 (Two) Times a Day for 14 days, THEN 1 tablet Daily for 28 days.   Start Date: December 2, 2021     meloxicam 7.5 MG tablet  Commonly known as: MOBIC  What changed: additional instructions   Take 1 tablet by mouth Daily for 14 days.      pantoprazole 40 MG EC tablet  Commonly known as: PROTONIX  What changed: Another medication with the same name was added. Make sure you understand how and when to take each.   40 mg, Oral, Daily      pantoprazole 40 MG EC tablet  Commonly known as: PROTONIX  What changed: You were already taking a medication with the same name, and this prescription was added. Make sure you understand how and when to take each.   40 mg, Oral, Daily      simvastatin 40 MG tablet  Commonly known as: ZOCOR  What changed: when to take this   40 mg, Oral, Nightly         Continue These Medications      Instructions Start Date   benazepril 10 MG tablet  Commonly known as: LOTENSIN   10 mg, Oral, Daily      tamsulosin 0.4 MG capsule 24 hr capsule  Commonly known as: FLOMAX   1 capsule, Oral, Daily         Stop These Medications    cephalexin 500 MG capsule  Commonly known as: KEFLEX     Chlorhexidine Gluconate Cloth 2 % pads     Fish Oil 1000 MG capsule delayed-release     magnesium oxide 400 MG tablet  Commonly known as: MAG-OX     multivitamin tablet tablet     mupirocin 2 % nasal ointment  Commonly known as: BACTROBAN     TURMERIC PO            Discharge Instructions: Patient is to continue with physical therapy exercises daily and continue working with the physical therapist as ordered. Patient may weight bear as tolerated. Apply ice regularly. Patient may ice for long periods of time as long as ice is not directly on the skin. Patient instructed on frequent calf pumping exercises.  Patient also instructed on incentive spirometer during hospitalization and encouraged to continue to use at home regularly.    Dressing: The dressing is designed  to be left in place until you return to the office in 2 weeks.  The suction unit should stop functioning at 7 days and the green light will switch to yellow.  At that point the suction unit and tubing can be disconnected at the port closest to the dressing.  The suction unit and tubing may be discarded.  You may shower immediately upon return home, you will need to turn the pump off by depressing the orange button once and then you may disconnect the pump and tubing at the connection port.  After showering, shake off the excess water and reattach the tubing.  Restart the pump by depressing the orange button one time and you will notice the green light flashing again.  If the dressing becomes disloged or saturated it should be changed. Please refer to the [facility] information sheet if you have any questions about the dressing.  If you have a home health nurse or therapist they can be contacted to assist with dressing change or repair. You may also call the [facility] dressing hotline for questions related to the dressing ([phone]). If there still other problems or questions related to the dressing despite these measures then you can contact Ashley at our office [phone].  If for some reason the [facility] dressing is removed, after 7 days the wound can be gently cleaned with antibacterial soap then allowed to dry and covered with a dry sterile dressing. The wound should be covered at all times except while showering.  Patient may change dressings daily and prn using sterile 4x4 and paper tape, and should call if any unusual drainage, redness or swelling.*  Follow up appointment in 2 weeks - patient to call the office at [phone] to schedule.  Patient will be discharged on aspirin 81mg BID x weeks, then daily x 4weeks    Discharge Diagnosis:    Patient Active Problem List   Diagnosis   • Anxiety   • Arthritis   • Gastroesophageal reflux disease with esophagitis   • Hyperlipidemia   • Hypertension   • Nocturia   •  Thrombocytopenia (HCC)   • Morbid obesity due to excess calories (HCC)   • Acute pain of left knee   • Osteoarthrosis, localized, primary, knee   • Health care maintenance   • KIEL on CPAP   • Hypersomnia with sleep apnea   • Obesity (BMI 30-39.9)   • Sleep deprivation   • Sleep related hypoxia   • Shift work sleep disorder   • OA (osteoarthritis) of knee   • Primary osteoarthritis of right knee   • Degenerative, intervertebral disc, cervical       Follow-up Appointments  Future Appointments   Date Time Provider Department Center   12/16/2021 11:00 AM Rhean Fernández MD [facility] [address] [facility]   8/1/2022  2:00 PM Deborah Bird MD [facility] None          Luciana Whitaker, APRN  12/02/21  07:45 EST

## 2021-12-16 ENCOUNTER — OFFICE VISIT (OUTPATIENT)
Dept: ORTHOPEDIC SURGERY | Facility: CLINIC | Age: 71
End: 2021-12-16

## 2021-12-16 VITALS — HEIGHT: 72 IN | TEMPERATURE: 96.8 F | WEIGHT: 252 LBS | BODY MASS INDEX: 34.13 KG/M2 | RESPIRATION RATE: 18 BRPM

## 2021-12-16 DIAGNOSIS — Z96.652 STATUS POST LEFT KNEE REPLACEMENT: Primary | ICD-10-CM

## 2021-12-16 PROCEDURE — 99024 POSTOP FOLLOW-UP VISIT: CPT | Performed by: ORTHOPAEDIC SURGERY

## 2021-12-16 NOTE — PROGRESS NOTES
Aldair Cordova : 1950 MRN: [id number] DATE: 2021    DIAGNOSIS: 2 week follow up left total knee      SUBJECTIVE:Patient returns today for 2 week follow up of left total knee replacement. Patient reports doing well with no unusual complaints. Appears to be progressing appropriately.    OBJECTIVE:   Exam:. The incision is healing appropriately. No sign of infection. Range of motion is progressing as expected. The calf is soft and nontender with a negative Homans sign.    ASSESSMENT: 2 week status post left knee replacement.    PLAN: 1) Staples removed and steri strips applied   2) Order given for PT   3) Discontinue ZANE hose   4) Continue ice PRN   5) aspirin 81 mg orally every day for 1 month   6) Follow up in 6 weeks with repeat Xrays of left knee (3views)    Rehan Fernández MD  2021

## 2022-01-27 ENCOUNTER — OFFICE VISIT (OUTPATIENT)
Dept: ORTHOPEDIC SURGERY | Facility: CLINIC | Age: 72
End: 2022-01-27

## 2022-01-27 VITALS — HEIGHT: 72 IN | BODY MASS INDEX: 34.13 KG/M2 | RESPIRATION RATE: 20 BRPM | WEIGHT: 252 LBS | TEMPERATURE: 96.9 F

## 2022-01-27 DIAGNOSIS — Z96.652 STATUS POST TOTAL LEFT KNEE REPLACEMENT: Primary | ICD-10-CM

## 2022-01-27 PROCEDURE — 73562 X-RAY EXAM OF KNEE 3: CPT | Performed by: NURSE PRACTITIONER

## 2022-01-27 PROCEDURE — 99024 POSTOP FOLLOW-UP VISIT: CPT | Performed by: NURSE PRACTITIONER

## 2022-01-27 RX ORDER — ASPIRIN 81 MG/1
81 TABLET, CHEWABLE ORAL DAILY
COMMUNITY

## 2022-01-27 NOTE — PROGRESS NOTES
Aldair Cordova : 1950 MRN: [id number] DATE: 2022    DIAGNOSIS: 8 week follow up left total knee      SUBJECTIVE:Patient returns today for 8 week follow up of left total knee replacement. Patient reports doing well with no unusual complaints. Appears to be progressing appropriately.  Patient states that he only has pain while sleeping in certain positions.  Reports that he finished up physical therapy 2 weeks ago.  States he is doing his home exercises.    OBJECTIVE:   Exam:. The incision is well healed. No sign of infection. Range of motion is measured at 0 to 130. The calf is soft and nontender with a negative Homans sign. Strength is progressing and the patient is ambulating appropriately.    DIAGNOSTIC STUDIES  Xrays: 3 views of the left knee (AP, lateral, and sunrise) were ordered and reviewed for evaluation of recent knee replacement. They demonstrate a well positioned, well aligned knee replacement without complicating factors noted. In comparison with previous films there has been no change.    ASSESSMENT: 8 week status post left knee replacement.    PLAN: 1) Continue with PT exercises as prescribed   2) Follow up in 10 months for first annual visit    RILEY Maher  2022     Dragon Dication Notice:    This note was dictated by using dragon dictation.

## 2022-08-08 ENCOUNTER — OFFICE VISIT (OUTPATIENT)
Dept: SLEEP MEDICINE | Facility: HOSPITAL | Age: 72
End: 2022-08-08

## 2022-08-08 VITALS
WEIGHT: 265.2 LBS | HEIGHT: 72 IN | BODY MASS INDEX: 35.92 KG/M2 | HEART RATE: 95 BPM | OXYGEN SATURATION: 96 % | DIASTOLIC BLOOD PRESSURE: 79 MMHG | SYSTOLIC BLOOD PRESSURE: 151 MMHG

## 2022-08-08 DIAGNOSIS — G47.33 OBSTRUCTIVE SLEEP APNEA, ADULT: Primary | ICD-10-CM

## 2022-08-08 PROCEDURE — G0463 HOSPITAL OUTPT CLINIC VISIT: HCPCS

## 2022-08-08 NOTE — PROGRESS NOTES
[facility]                          Chief Complaint:   Follow up for obstructive sleep apnea, obesity, HTN.    History of present illness:   Subjective     KIEL:  He was initially diagnosed with KIEL in 2009. AHI= 24/h.      He's currently on CPAP on uses it regularly.  He reported benefit in term of daytime alertness and fatigue. He has no issues with his machine.       Mask: FFM which does fit well.  No significant air leak or dry mouth  DME: [facility]    ESS: Total score: 5     HTN:  On benazepril and doxazosin.  Takes his meds regularly.  No side effects.  BP is usually less than 140-130 systolic but seems to be elevated when he sees his doctors.     REVIEW OF SYSTEMS:   HEENT: Nasal congestion but no postnasal drip   CARDIOVASCULAR: No chest pain, chest pressure or chest discomfort. No palpitations or edema.   RESPIRATORY: No shortness of breath, cough or sputum.   GASTROINTESTINAL: No abdominal bloating or reflux   NEUROLOGICAL/PSYCHOATRY: No depression nor anxiety    Past Medical History:  Past Medical History:   Diagnosis Date   • Ankle sprain    • Anxiety    • Benign essential hypertension    • BPH (benign prostatic hyperplasia)    • Degenerative, intervertebral disc, cervical    • Dislocation of finger    • Fracture, finger    • GERD (gastroesophageal reflux disease)    • History of kidney stones    • HLD (hyperlipidemia)    • Knee sprain    • Knee swelling    • Neck strain    • Nocturia    • Osteoarthrosis, localized, primary, knee    • Sleep apnea     uses cpap   • Tear of meniscus of knee    • Tendinitis of knee    • Tennis elbow    • Wrist sprain    ,   Past Surgical History:   Procedure Laterality Date   • COLONOSCOPY  10/21/2016   • CYST REMOVAL      OFF NECK AND HEAD   • EPIDURAL BLOCK     • EXTRACORPOREAL SHOCK WAVE LITHOTRIPSY (ESWL)     • JOINT REPLACEMENT  July. 2020    Right knee   • KNEE ARTHROSCOPY Left    • TOTAL KNEE ARTHROPLASTY Right 7/10/2020     Procedure: TOTAL KNEE ARTHROPLASTY;  Surgeon: Rehan Fernández MD;  Location: [facility] OR;  Service: Orthopedics;  Laterality: Right;   • TOTAL KNEE ARTHROPLASTY Left 12/1/2021    Procedure: TOTAL KNEE ARTHROPLASTY;  Surgeon: Rehan Fernández MD;  Location: [facility] MAIN OR;  Service: Orthopedics;  Laterality: Left;   ,   Social History     Socioeconomic History   • Marital status:    Tobacco Use   • Smoking status: Never Smoker   • Smokeless tobacco: Never Used   Vaping Use   • Vaping Use: Never used   Substance and Sexual Activity   • Alcohol use: Not Currently     Alcohol/week: 1.0 standard drink     Types: 1 Cans of beer per week     Comment: VERY LITTLE   • Drug use: Never   • Sexual activity: Not Currently     Partners: Female     Birth control/protection: Post-menopausal, None     E-cigarette/Vaping   • E-cigarette/Vaping Use Never User    • Passive Exposure No    • Counseling Given No      E-cigarette/Vaping Substances   • Nicotine No    • THC No    • CBD No    • Flavoring No      E-cigarette/Vaping Devices   • Disposable No    • Pre-filled or Refillable Cartridge No    • Refillable Tank No    • Pre-filled Pod No         and Allergies:  Xanax [alprazolam]    Medication Review:     Current Outpatient Medications:   •  aspirin 81 MG chewable tablet, Chew 81 mg Daily., Disp: , Rfl:   •  benazepril (LOTENSIN) 10 MG tablet, Take 1 tablet by mouth Daily., Disp: 90 tablet, Rfl: 1  •  HYDROcodone-acetaminophen (NORCO) 7.5-325 MG per tablet, Take 1-2 tablets by mouth every 4-6 hours as needed for pain.  Take 2 only when in severe pain., Disp: 60 tablet, Rfl: 0  •  ondansetron (Zofran) 4 MG tablet, Take 1 tablet by mouth Every 8 (Eight) Hours As Needed for Nausea or Vomiting for up to 10 doses., Disp: 10 tablet, Rfl: 0  •  pantoprazole (PROTONIX) 40 MG EC tablet, Take 1 tablet by mouth Daily., Disp: 90 tablet, Rfl: 1  •  simvastatin (ZOCOR) 40 MG tablet, Take 1 tablet by mouth Every Night. (Patient taking  "differently: Take 40 mg by mouth Daily.), Disp: 90 tablet, Rfl: 1  •  tamsulosin (FLOMAX) 0.4 MG capsule 24 hr capsule, Take 1 capsule by mouth Daily., Disp: , Rfl:   No current facility-administered medications for this visit.    Facility-Administered Medications Ordered in Other Visits:   •  Chlorhexidine Gluconate 2 % pads 2 each, 2 pad, Apply externally, BID, Rehan Fernández MD  •  Chlorhexidine Gluconate Cloth 2 % pads, , Apply externally, BID, Luciana Whitaker APRN      Objective   Vital Signs:  Vitals:    08/08/22 1300   BP: 151/79   Pulse: 95   SpO2: 96%   Weight: 120 kg (265 lb 3.2 oz)   Height: 182.9 cm (72\")     Body mass index is 35.97 kg/m².          Physical Exam:   General Appearance:    Alert, cooperative, in no acute distress   ENMT:  Freidman score 3, narrow distance in between the posterior pharyngeal pillars.  Mild scalloping of the tongue   Neck:  Large. Trachea midline. No thyromegaly.   Lungs:     Clear to auscultation,respirations regular, even and                  unlabored    Heart:    Regular rhythm and normal rate, normal S1 and S2, no            Murmur.   Abdomen:     Obese.  Soft.  No tenderness.  No HSM    Neuro:   Conscious, alert, oriented x3. Appropriate mood and affect.    Extremities:   Moves all extremities well, no edema, no cyanosis, no             Redness              Diagnostic data:  NPSG split night: 2/4/09 at [facility] at a weight of 230 pounds. Moderate to severe KIEL with AHI of 24.4 that increased to 53.3 during REM sleep. 2.7% of TST spent < 90% saturation. Arousal index 20. Controlled on CPAP at 7 cmH20.    CPAP download showed:  Date: Last 30 days  Usage (days): 100%  Days used>4h: 92%  AHI: 1.7 /h  Leak: 0   Usage: 6 h and 33 min   CPAP: 10 cm H2O      Hemoglobin   Date Value Ref Range Status   12/02/2021 14.0 13.0 - 17.7 g/dL Final     CO2   Date Value Ref Range Status   11/15/2021 25.6 22.0 - 29.0 mmol/L Final       Assessment   1. KIEL, on CPAP " 10  2. Obesity (BMI = 32)  3. Essential hypertension        PLAN:    Discussed the result of the download.   Patient is compliant with therapy and clinically benefit from treatment.  Patient was encouraged to continue using his CPAP.  Refill supplies.      Discussed the association between obstructive sleep apnea and cardiovascular disease including HTN and the beneficial effect of Pap therapy in reducing the risk of major cardiovascular events.  Continue current meds.      Counseled for weight loss.  Encouraged to exercise regularly and cut down on carbohydrates.  Discussed that losing weight may decrease the severity of sleep apnea and obviate the need of CPAP therapy.                This note was dictated utilizing Dragon dictation

## 2022-12-01 ENCOUNTER — OFFICE VISIT (OUTPATIENT)
Dept: ORTHOPEDIC SURGERY | Facility: CLINIC | Age: 72
End: 2022-12-01

## 2022-12-01 VITALS — TEMPERATURE: 97.6 F | BODY MASS INDEX: 35.89 KG/M2 | WEIGHT: 265 LBS | HEIGHT: 72 IN

## 2022-12-01 DIAGNOSIS — Z96.652 STATUS POST TOTAL LEFT KNEE REPLACEMENT: Primary | ICD-10-CM

## 2022-12-01 PROCEDURE — 73562 X-RAY EXAM OF KNEE 3: CPT | Performed by: NURSE PRACTITIONER

## 2022-12-01 PROCEDURE — 99212 OFFICE O/P EST SF 10 MIN: CPT | Performed by: NURSE PRACTITIONER

## 2022-12-01 NOTE — PROGRESS NOTES
"Aldair Cordova : 1950 MRN: [id number] DATE: 2022    DIAGNOSIS: Annual follow up left total knee      SUBJECTIVE:Patient returns today for a one year follow up of left total knee replacement. Patient reports doing well with no unusual complaints. Denies any limitations due to the knee.  Reports that he is very happy with his outcome from surgery.  Denies any signs or symptoms of infection, and he is without any other significant complaints today    OBJECTIVE:    Temp 97.6 °F (36.4 °C) (Temporal)   Ht 182.9 cm (72\")   Wt 120 kg (265 lb)   BMI 35.94 kg/m²   Family History   Problem Relation Age of Onset   • Cancer Mother         Lung   • Sleep apnea Other    • Lung cancer Other    • Cancer Maternal Aunt         Breast cancer   • Cancer Maternal Aunt         Breast cancer   • Cancer Sister         Breast cancer   • Cancer Paternal Grandfather         Stomach cancer   • Malig Hyperthermia Neg Hx      Past Medical History:   Diagnosis Date   • Ankle sprain    • Anxiety    • Benign essential hypertension    • BPH (benign prostatic hyperplasia)    • Degenerative, intervertebral disc, cervical    • Dislocation of finger    • Fracture, finger    • GERD (gastroesophageal reflux disease)    • History of kidney stones    • HLD (hyperlipidemia)    • Knee sprain    • Knee swelling    • Neck strain    • Nocturia    • Osteoarthrosis, localized, primary, knee    • Sleep apnea     uses cpap   • Tear of meniscus of knee    • Tendinitis of knee    • Tennis elbow    • Wrist sprain      Past Surgical History:   Procedure Laterality Date   • COLONOSCOPY  10/21/2016   • CYST REMOVAL      OFF NECK AND HEAD   • EPIDURAL BLOCK     • EXTRACORPOREAL SHOCK WAVE LITHOTRIPSY (ESWL)     • JOINT REPLACEMENT  2020    Right knee   • KNEE ARTHROSCOPY Left    • TOTAL KNEE ARTHROPLASTY Right 7/10/2020    Procedure: TOTAL KNEE ARTHROPLASTY;  Surgeon: Rehan Fernández MD;  Location: [facility];  Service: Orthopedics;  Laterality: " Right;   • TOTAL KNEE ARTHROPLASTY Left 12/1/2021    Procedure: TOTAL KNEE ARTHROPLASTY;  Surgeon: Rehan Fernández MD;  Location: [facility] OR;  Service: Orthopedics;  Laterality: Left;     Social History     Socioeconomic History   • Marital status:    Tobacco Use   • Smoking status: Never   • Smokeless tobacco: Never   Vaping Use   • Vaping Use: Never used   Substance and Sexual Activity   • Alcohol use: Not Currently     Alcohol/week: 1.0 standard drink     Types: 1 Cans of beer per week     Comment: VERY LITTLE   • Drug use: Never   • Sexual activity: Not Currently     Partners: Female     Birth control/protection: Post-menopausal, None     Review of Systems - a 14 point review of systems was performed. All systems were negative.    Exam:. The incision is well healed. Range of motion is measured at 0 to 120. The calf is soft and nontender with a negative Homans sign. Alignment is neutral. Good quad strength. There is no evidence of varus/valgus or flexion instability. No effusion. Intact to light touch with palpable distal pulses.     DIAGNOSTIC STUDIES  Xrays: 3 views of the left knee (AP, lateral, and sunrise) were ordered and reviewed for evaluation of recent knee replacement. They demonstrate a well positioned, well aligned knee replacement without complicating factors noted. In comparison with previous films there has been no change.    ASSESSMENT: Annual follow up left knee replacement.    PLAN:  Continue activities as tolerated    Follow up as needed      RILEY Maher  12/1/2022

## 2023-06-06 VITALS
HEART RATE: 60 BPM | RESPIRATION RATE: 19 BRPM | DIASTOLIC BLOOD PRESSURE: 77 MMHG | RESPIRATION RATE: 21 BRPM | RESPIRATION RATE: 20 BRPM | DIASTOLIC BLOOD PRESSURE: 68 MMHG | SYSTOLIC BLOOD PRESSURE: 148 MMHG | HEART RATE: 61 BPM | RESPIRATION RATE: 11 BRPM | DIASTOLIC BLOOD PRESSURE: 71 MMHG | WEIGHT: 255 LBS | SYSTOLIC BLOOD PRESSURE: 114 MMHG | HEART RATE: 64 BPM | SYSTOLIC BLOOD PRESSURE: 109 MMHG | HEART RATE: 71 BPM | SYSTOLIC BLOOD PRESSURE: 142 MMHG | RESPIRATION RATE: 28 BRPM | TEMPERATURE: 97.9 F | DIASTOLIC BLOOD PRESSURE: 64 MMHG | SYSTOLIC BLOOD PRESSURE: 108 MMHG | SYSTOLIC BLOOD PRESSURE: 153 MMHG | SYSTOLIC BLOOD PRESSURE: 105 MMHG | OXYGEN SATURATION: 98 % | RESPIRATION RATE: 17 BRPM | OXYGEN SATURATION: 99 % | OXYGEN SATURATION: 100 % | OXYGEN SATURATION: 90 % | SYSTOLIC BLOOD PRESSURE: 101 MMHG | DIASTOLIC BLOOD PRESSURE: 73 MMHG | DIASTOLIC BLOOD PRESSURE: 67 MMHG | DIASTOLIC BLOOD PRESSURE: 78 MMHG | SYSTOLIC BLOOD PRESSURE: 137 MMHG | DIASTOLIC BLOOD PRESSURE: 57 MMHG | DIASTOLIC BLOOD PRESSURE: 58 MMHG | SYSTOLIC BLOOD PRESSURE: 140 MMHG | RESPIRATION RATE: 18 BRPM | RESPIRATION RATE: 23 BRPM | RESPIRATION RATE: 30 BRPM | OXYGEN SATURATION: 89 % | OXYGEN SATURATION: 85 % | HEART RATE: 65 BPM | OXYGEN SATURATION: 91 % | SYSTOLIC BLOOD PRESSURE: 121 MMHG | HEART RATE: 69 BPM | OXYGEN SATURATION: 92 % | HEART RATE: 70 BPM

## 2023-06-12 ENCOUNTER — AMBULATORY SURGICAL CENTER (AMBULATORY)
Dept: URBAN - METROPOLITAN AREA SURGERY 17 | Facility: SURGERY | Age: 73
End: 2023-06-12

## 2023-06-12 ENCOUNTER — OFFICE (AMBULATORY)
Dept: URBAN - METROPOLITAN AREA PATHOLOGY 4 | Facility: PATHOLOGY | Age: 73
End: 2023-06-12

## 2023-06-12 DIAGNOSIS — D12.2 BENIGN NEOPLASM OF ASCENDING COLON: ICD-10-CM

## 2023-06-12 DIAGNOSIS — Z86.010 PERSONAL HISTORY OF COLONIC POLYPS: ICD-10-CM

## 2023-06-12 PROBLEM — K63.5 POLYP OF COLON: Status: ACTIVE | Noted: 2023-06-12

## 2023-06-12 LAB
GI HISTOLOGY: A. UNSPECIFIED: (no result)
GI HISTOLOGY: PDF REPORT: (no result)

## 2023-06-12 PROCEDURE — 88305 TISSUE EXAM BY PATHOLOGIST: CPT | Performed by: INTERNAL MEDICINE

## 2023-06-12 PROCEDURE — 45385 COLONOSCOPY W/LESION REMOVAL: CPT | Mod: PT | Performed by: INTERNAL MEDICINE

## 2023-08-21 ENCOUNTER — OFFICE VISIT (OUTPATIENT)
Dept: SLEEP MEDICINE | Facility: HOSPITAL | Age: 73
End: 2023-08-21
Payer: MEDICARE

## 2023-08-21 VITALS
BODY MASS INDEX: 34.27 KG/M2 | HEART RATE: 60 BPM | HEIGHT: 72 IN | DIASTOLIC BLOOD PRESSURE: 71 MMHG | WEIGHT: 253 LBS | OXYGEN SATURATION: 95 % | SYSTOLIC BLOOD PRESSURE: 122 MMHG

## 2023-08-21 DIAGNOSIS — G47.33 OBSTRUCTIVE SLEEP APNEA, ADULT: Primary | ICD-10-CM

## 2023-08-21 PROCEDURE — G0463 HOSPITAL OUTPT CLINIC VISIT: HCPCS

## 2023-08-21 NOTE — PROGRESS NOTES
[facility]                          Chief Complaint:   F/up KIEL, obesity, HTN.    History of present illness:   Subjective     KIEL:  He was initially diagnosed with KIEL in 2009. AHI= 24/h.      He's currently on CPAP on uses it regularly.  He reported benefit in term of daytime alertness and fatigue. He has no issues with his machine.   He received a new CPAP in 2022.     Sleep schedule: 1 AM - 4 to 5 AM then 8 AM to noon.    Mask: FFM which does fit well.  No significant air leak or dry mouth  DME: [facility]    ESS: Total score: 6     HTN:  On Benazepril.  He takes his meds regularly.  No side effects.  BP is usually regulated.     REVIEW OF SYSTEMS:   HEENT: Nasal congestion but no postnasal drip   CARDIOVASCULAR: No chest pain, chest pressure or chest discomfort. No palpitations or edema.   RESPIRATORY: No shortness of breath, cough or sputum.   GASTROINTESTINAL: No abdominal bloating or reflux   NEUROLOGICAL/PSYCHOATRY: No depression nor anxiety    Past Medical History:  Past Medical History:   Diagnosis Date    Ankle sprain     Anxiety     Benign essential hypertension     BPH (benign prostatic hyperplasia)     Degenerative, intervertebral disc, cervical     Dislocation of finger     Fracture, finger     GERD (gastroesophageal reflux disease)     History of kidney stones     HLD (hyperlipidemia)     Knee sprain     Knee swelling     Neck strain     Nocturia     Osteoarthrosis, localized, primary, knee     Sleep apnea     uses cpap    Tear of meniscus of knee     Tendinitis of knee     Tennis elbow     Wrist sprain    ,   Past Surgical History:   Procedure Laterality Date    COLONOSCOPY  10/21/2016    CYST REMOVAL      OFF NECK AND HEAD    EPIDURAL BLOCK      EXTRACORPOREAL SHOCK WAVE LITHOTRIPSY (ESWL)      JOINT REPLACEMENT  July. 2020    Right knee    KNEE ARTHROSCOPY Left     TOTAL KNEE ARTHROPLASTY Right 7/10/2020    Procedure: TOTAL KNEE ARTHROPLASTY;  Surgeon: Pradeep  Rehan SUE MD;  Location: [facility] MAIN OR;  Service: Orthopedics;  Laterality: Right;    TOTAL KNEE ARTHROPLASTY Left 12/1/2021    Procedure: TOTAL KNEE ARTHROPLASTY;  Surgeon: Rehan Fernández MD;  Location: [facility] MAIN OR;  Service: Orthopedics;  Laterality: Left;   ,   Social History     Socioeconomic History    Marital status:    Tobacco Use    Smoking status: Never    Smokeless tobacco: Never   Vaping Use    Vaping Use: Never used   Substance and Sexual Activity    Alcohol use: Not Currently     Alcohol/week: 1.0 standard drink     Types: 1 Cans of beer per week     Comment: VERY LITTLE    Drug use: Never    Sexual activity: Not Currently     Partners: Female     Birth control/protection: Post-menopausal, None     E-cigarette/Vaping    E-cigarette/Vaping Use Never User     Passive Exposure No     Counseling Given No      E-cigarette/Vaping Substances    Nicotine No     THC No     CBD No     Flavoring No      E-cigarette/Vaping Devices    Disposable No     Pre-filled or Refillable Cartridge No     Refillable Tank No     Pre-filled Pod No         and Allergies:  Xanax [alprazolam]    Medication Review:     Current Outpatient Medications:     aspirin 81 MG chewable tablet, Chew 81 mg Daily., Disp: , Rfl:     benazepril (LOTENSIN) 10 MG tablet, Take 1 tablet by mouth Daily., Disp: 90 tablet, Rfl: 1    HYDROcodone-acetaminophen (NORCO) 7.5-325 MG per tablet, Take 1-2 tablets by mouth every 4-6 hours as needed for pain.  Take 2 only when in severe pain., Disp: 60 tablet, Rfl: 0    ondansetron (Zofran) 4 MG tablet, Take 1 tablet by mouth Every 8 (Eight) Hours As Needed for Nausea or Vomiting for up to 10 doses., Disp: 10 tablet, Rfl: 0    pantoprazole (PROTONIX) 40 MG EC tablet, Take 1 tablet by mouth Daily., Disp: 90 tablet, Rfl: 1    simvastatin (ZOCOR) 40 MG tablet, Take 1 tablet by mouth Every Night. (Patient taking differently: Take 40 mg by mouth Daily.), Disp: 90 tablet, Rfl: 1    tamsulosin (FLOMAX) 0.4 MG  "capsule 24 hr capsule, Take 1 capsule by mouth Daily., Disp: , Rfl:   No current facility-administered medications for this visit.    Facility-Administered Medications Ordered in Other Visits:     Chlorhexidine Gluconate 2 % pads 2 each, 2 pad , Apply externally, BID, Rehan Fernández MD    Chlorhexidine Gluconate Cloth 2 % pads, , Apply externally, BID, Luciana Whitaker APRN      Objective   Vital Signs:  Vitals:    08/21/23 1300   BP: 122/71   Pulse: 60   SpO2: 95%   Weight: 115 kg (253 lb)   Height: 182.9 cm (72.01\")     Body mass index is 34.31 kg/mý.          Physical Exam:   General Appearance:    Alert, cooperative, in no acute distress   ENMT:  Freidman score 3, narrow distance in between the posterior pharyngeal pillars.  Mild scalloping of the tongue   Neck:  Large. Trachea midline. No thyromegaly.   Lungs:     Clear to auscultation,respirations regular, even and   unlabored    Heart:    Regular rhythm and normal rate, normal S1 and S2, no  murmur.   Abdomen:     Obese.  Soft.  No tenderness.  No HSM    Neuro:   Conscious, alert, oriented x3. Appropriate mood and affect.    Extremities:   Moves all extremities well, no edema, no cyanosis, no  redness              Diagnostic data:  NPSG split night: 2/4/09 at [facility] at a weight of 230 pounds. Moderate to severe KIEL with AHI of 24.4 that increased to 53.3 during REM sleep. 2.7% of TST spent < 90% saturation. Arousal index 20. Controlled on CPAP at 7 cmH20.    CPAP download showed:  Date: Last 90 days  Usage (days): 99%  Days used>4h: 85%  AHI: 0.9 /h  Leak: 1 min 37 sec  Usage: 6 h and 5 min  CPAP: 10 cm H2O      Hemoglobin   Date Value Ref Range Status   12/02/2021 14.0 13.0 - 17.7 g/dL Final     CO2   Date Value Ref Range Status   11/15/2021 25.6 22.0 - 29.0 mmol/L Final       Assessment   KIEL, on CPAP 10  Obesity (BMI = 34)  Essential hypertension        PLAN:    Discussed the result of the download.   Patient is compliant with therapy and " clinically benefit from treatment.  Patient was encouraged to continue using his CPAP.  Refill supplies.      Discussed the association between obstructive sleep apnea and cardiovascular disease including HTN and the beneficial effect of Pap therapy in reducing the risk of major cardiovascular events.  Continue current meds.      Counseled for weight loss.  Encouraged to exercise regularly and cut down on carbohydrates.  Discussed that losing weight may decrease the severity of sleep apnea and obviate the need of CPAP therapy.                This note was dictated utilizing Dragon dictation

## 2024-08-19 ENCOUNTER — OFFICE VISIT (OUTPATIENT)
Dept: SLEEP MEDICINE | Facility: HOSPITAL | Age: 74
End: 2024-08-19
Payer: MEDICARE

## 2024-08-19 VITALS
BODY MASS INDEX: 35.62 KG/M2 | HEART RATE: 70 BPM | DIASTOLIC BLOOD PRESSURE: 79 MMHG | WEIGHT: 263 LBS | HEIGHT: 72 IN | OXYGEN SATURATION: 94 % | SYSTOLIC BLOOD PRESSURE: 144 MMHG

## 2024-08-19 DIAGNOSIS — G47.33 OBSTRUCTIVE SLEEP APNEA, ADULT: Primary | ICD-10-CM

## 2024-08-19 PROCEDURE — G0463 HOSPITAL OUTPT CLINIC VISIT: HCPCS

## 2024-08-19 NOTE — PROGRESS NOTES
[facility]                          Chief Complaint:   F/up KIEL, obesity, HTN.    History of present illness:   Subjective     KIEL:  He was initially diagnosed with KIEL in 2009. AHI= 24/h.      He's currently on CPAP on uses it regularly.  He reported benefit in term of daytime alertness and fatigue. He has no issues with his machine.   He received a new CPAP in 2022.     Sleep schedule: 12 AM - 7 AM.     Mask: FFM which does fit well.  No significant air leak or dry mouth  DME: [facility]    ESS: Total score: 6     HTN:  On Benazepril.  He takes his meds regularly.  No side effects.  BP is usually regulated.     REVIEW OF SYSTEMS:   HEENT: Nasal congestion but no postnasal drip   CARDIOVASCULAR: No chest pain, chest pressure or chest discomfort. No palpitations or edema.   RESPIRATORY: No shortness of breath, cough or sputum.   GASTROINTESTINAL: No abdominal bloating or reflux   NEUROLOGICAL/PSYCHOATRY: No depression nor anxiety    Past Medical History:  Past Medical History:   Diagnosis Date    Ankle sprain     Anxiety     Benign essential hypertension     BPH (benign prostatic hyperplasia)     Degenerative, intervertebral disc, cervical     Dislocation of finger     Fracture, finger     GERD (gastroesophageal reflux disease)     History of kidney stones     HLD (hyperlipidemia)     Knee sprain     Knee swelling     Neck strain     Nocturia     Osteoarthrosis, localized, primary, knee     Sleep apnea     uses cpap    Tear of meniscus of knee     Tendinitis of knee     Tennis elbow     Wrist sprain    ,   Past Surgical History:   Procedure Laterality Date    COLONOSCOPY  10/21/2016    CYST REMOVAL      OFF NECK AND HEAD    EPIDURAL BLOCK      EXTRACORPOREAL SHOCK WAVE LITHOTRIPSY (ESWL)      JOINT REPLACEMENT  July. 2020    Right knee    KNEE ARTHROSCOPY Left     TOTAL KNEE ARTHROPLASTY Right 7/10/2020    Procedure: TOTAL KNEE ARTHROPLASTY;  Surgeon: Rehan Fernández MD;  Location:  [facility] MAIN OR;  Service: Orthopedics;  Laterality: Right;    TOTAL KNEE ARTHROPLASTY Left 12/1/2021    Procedure: TOTAL KNEE ARTHROPLASTY;  Surgeon: Rehan Fernández MD;  Location: [facility] MAIN OR;  Service: Orthopedics;  Laterality: Left;   ,   Social History     Socioeconomic History    Marital status:    Tobacco Use    Smoking status: Never    Smokeless tobacco: Never   Vaping Use    Vaping status: Never Used   Substance and Sexual Activity    Alcohol use: Not Currently     Alcohol/week: 1.0 standard drink of alcohol     Types: 1 Cans of beer per week     Comment: VERY LITTLE    Drug use: Never    Sexual activity: Not Currently     Partners: Female     Birth control/protection: Post-menopausal, None     E-cigarette/Vaping    E-cigarette/Vaping Use Never User     Passive Exposure No     Counseling Given No      E-cigarette/Vaping Substances    Nicotine No     THC No     CBD No     Flavoring No      E-cigarette/Vaping Devices    Disposable No     Pre-filled or Refillable Cartridge No     Refillable Tank No     Pre-filled Pod No         and Allergies:  Xanax [alprazolam]    Medication Review:     Current Outpatient Medications:     aspirin 81 MG chewable tablet, Chew 81 mg Daily., Disp: , Rfl:     benazepril (LOTENSIN) 10 MG tablet, Take 1 tablet by mouth Daily., Disp: 90 tablet, Rfl: 1    HYDROcodone-acetaminophen (NORCO) 7.5-325 MG per tablet, Take 1-2 tablets by mouth every 4-6 hours as needed for pain.  Take 2 only when in severe pain., Disp: 60 tablet, Rfl: 0    ondansetron (Zofran) 4 MG tablet, Take 1 tablet by mouth Every 8 (Eight) Hours As Needed for Nausea or Vomiting for up to 10 doses., Disp: 10 tablet, Rfl: 0    pantoprazole (PROTONIX) 40 MG EC tablet, Take 1 tablet by mouth Daily., Disp: 90 tablet, Rfl: 1    simvastatin (ZOCOR) 40 MG tablet, Take 1 tablet by mouth Every Night. (Patient taking differently: Take 40 mg by mouth Daily.), Disp: 90 tablet, Rfl: 1    tamsulosin (FLOMAX) 0.4 MG capsule 24  "hr capsule, Take 1 capsule by mouth Daily., Disp: , Rfl:   No current facility-administered medications for this visit.    Facility-Administered Medications Ordered in Other Visits:     Chlorhexidine Gluconate 2 % pads 2 each, 2 pad , Apply externally, BID, Rehan Fernández MD    Chlorhexidine Gluconate Cloth 2 % pads, , Apply externally, BID, Luciana Whitaker APRN      Objective   Vital Signs:  Vitals:    08/19/24 1300   BP: 144/79   Pulse: 70   SpO2: 94%   Weight: 119 kg (263 lb)   Height: 182.9 cm (72.01\")     Body mass index is 35.66 kg/m².          Physical Exam:   General Appearance:    Alert, cooperative, in no acute distress   ENMT:  Freidman score 3, narrow distance in between the posterior pharyngeal pillars.  Mild scalloping of the tongue   Neck:  Large. Trachea midline. No thyromegaly.   Lungs:     Clear to auscultation,respirations regular, even and   unlabored    Heart:    Regular rhythm and normal rate, normal S1 and S2, no  murmur.   Abdomen:     Obese.  Soft.  No tenderness.  No HSM    Neuro:   Conscious, alert, oriented x3. Appropriate mood and affect.    Extremities:   Moves all extremities well, no edema, no cyanosis, no  redness              Diagnostic data:  NPSG split night: 2/4/09 at [facility] at a weight of 230 pounds. Moderate to severe KIEL with AHI of 24.4 that increased to 53.3 during REM sleep. 2.7% of TST spent < 90% saturation. Arousal index 20. Controlled on CPAP at 7 cmH20.    CPAP download showed:  Date: Last 90 days  Usage (days): 100%  Days used>4h: 96%  AHI: 0.9 /h  Leak: 4 min   Usage: 6 h and 12 min  P90%: 11 cm H2O      Hemoglobin   Date Value Ref Range Status   12/02/2021 14.0 13.0 - 17.7 g/dL Final     CO2   Date Value Ref Range Status   11/15/2021 25.6 22.0 - 29.0 mmol/L Final       Assessment   KIEL, on CPAP 10  Obesity (BMI = 35)  Essential hypertension        PLAN:    Discussed the result of the download.   Patient is compliant with therapy and clinically " benefit from treatment.  Patient was encouraged to continue using his CPAP.  Refill supplies.    Continue Benazepril. Discussed the association between obstructive sleep apnea and cardiovascular disease including HTN and the beneficial effect of Pap therapy in reducing the risk of major cardiovascular events.  Continue current meds.      Counseled for weight loss.  Encouraged to exercise regularly and cut down on carbohydrates.  Discussed that losing weight may decrease the severity of sleep apnea and obviate the need of CPAP therapy.    RTC 12 months.             This note was dictated utilizing Dragon dictation

## 2025-08-11 ENCOUNTER — OFFICE VISIT (OUTPATIENT)
Dept: SLEEP MEDICINE | Facility: HOSPITAL | Age: 75
End: 2025-08-11
Payer: MEDICARE

## 2025-08-11 VITALS
OXYGEN SATURATION: 92 % | HEIGHT: 72 IN | HEART RATE: 75 BPM | WEIGHT: 262 LBS | DIASTOLIC BLOOD PRESSURE: 78 MMHG | BODY MASS INDEX: 35.49 KG/M2 | SYSTOLIC BLOOD PRESSURE: 131 MMHG

## 2025-08-11 DIAGNOSIS — G47.33 OBSTRUCTIVE SLEEP APNEA, ADULT: Primary | ICD-10-CM

## 2025-08-11 PROCEDURE — G0463 HOSPITAL OUTPT CLINIC VISIT: HCPCS

## (undated) DEVICE — DRSNG SURESITE WNDW 2.38X2.75

## (undated) DEVICE — SYS CLS SKIN PREMIERPRO EXOFINFUSION 22CM

## (undated) DEVICE — MAT FLR ABSORBENT LG 4FT 10 2.5FT

## (undated) DEVICE — SUT VIC 1 CT1 36IN J947H

## (undated) DEVICE — GLV SURG SENSICARE W/ALOE PF LF 8 STRL

## (undated) DEVICE — 3M™ IOBAN™ 2 ANTIMICROBIAL INCISE DRAPE 6640EZ: Brand: IOBAN™ 2

## (undated) DEVICE — KT DRN EVAC WND PVC PCH WTROC RND 10F400

## (undated) DEVICE — SOL NACL 0.9PCT 100ML SGL

## (undated) DEVICE — GLV SURG SENSICARE PI PF LF 7 GRN STRL

## (undated) DEVICE — GLV SURG SENSICARE PI MIC PF SZ7 LF STRL

## (undated) DEVICE — NEEDLE, QUINCKE 22GX3.5": Brand: MEDLINE INDUSTRIES, INC.

## (undated) DEVICE — DUAL CUT SAGITTAL BLADE

## (undated) DEVICE — PREP SOL POVIDONE/IODINE BT 4OZ

## (undated) DEVICE — TRAP FLD MINIVAC MEGADYNE 100ML

## (undated) DEVICE — GLV SURG BIOGEL LTX PF 7 1/2

## (undated) DEVICE — APPL DURAPREP IODOPHOR APL 26ML

## (undated) DEVICE — STERILE PATIENT PROTECTIVE PAD FOR IMP® KNEE POSITIONERS & COHESIVE WRAP (10 / CASE): Brand: DE MAYO KNEE POSITIONER®

## (undated) DEVICE — STPLR SKIN VISISTAT WD 35CT

## (undated) DEVICE — PK KN TOTL 40

## (undated) DEVICE — UNDERCAST PADDING: Brand: DEROYAL

## (undated) DEVICE — MEDI-VAC YANKAUER SUCTION HANDLE W/BULBOUS TIP: Brand: CARDINAL HEALTH

## (undated) DEVICE — GLV SURG PREMIERPRO ORTHO LTX PF SZ7.5 BRN

## (undated) DEVICE — PREMIUM WET SKIN PREP TRAY: Brand: MEDLINE INDUSTRIES, INC.

## (undated) DEVICE — GLV SURG SENSICARE W/ALOE PF LF 7.5 STRL

## (undated) DEVICE — ADHS SKIN DERMABOND TOP ADVANCED

## (undated) DEVICE — BNDG ELAS ELITE V/CLOSE 6IN 5YD LF STRL

## (undated) DEVICE — PENCL E/S ULTRAVAC TELESCP NOSE HOLSTR 10FT

## (undated) DEVICE — SUT VIC 0 CT1 36IN J946H